# Patient Record
Sex: FEMALE | Race: WHITE | NOT HISPANIC OR LATINO | URBAN - METROPOLITAN AREA
[De-identification: names, ages, dates, MRNs, and addresses within clinical notes are randomized per-mention and may not be internally consistent; named-entity substitution may affect disease eponyms.]

---

## 2017-04-27 ENCOUNTER — ON CAMPUS - OUTPATIENT (AMBULATORY)
Dept: URBAN - METROPOLITAN AREA HOSPITAL 2 | Facility: HOSPITAL | Age: 67
End: 2017-04-27
Payer: COMMERCIAL

## 2017-04-27 ENCOUNTER — HOSPITAL ENCOUNTER (OUTPATIENT)
Dept: OTHER | Facility: HOSPITAL | Age: 67
Setting detail: SPECIMEN
Discharge: HOME | End: 2017-04-27
Attending: INTERNAL MEDICINE | Admitting: INTERNAL MEDICINE

## 2017-04-27 ENCOUNTER — OFFICE (AMBULATORY)
Dept: URBAN - METROPOLITAN AREA CLINIC 64 | Facility: CLINIC | Age: 67
End: 2017-04-27
Payer: COMMERCIAL

## 2017-04-27 VITALS
HEART RATE: 71 BPM | SYSTOLIC BLOOD PRESSURE: 95 MMHG | DIASTOLIC BLOOD PRESSURE: 48 MMHG | DIASTOLIC BLOOD PRESSURE: 72 MMHG | DIASTOLIC BLOOD PRESSURE: 58 MMHG | SYSTOLIC BLOOD PRESSURE: 102 MMHG | SYSTOLIC BLOOD PRESSURE: 122 MMHG | SYSTOLIC BLOOD PRESSURE: 135 MMHG | HEART RATE: 73 BPM | SYSTOLIC BLOOD PRESSURE: 97 MMHG | OXYGEN SATURATION: 95 % | OXYGEN SATURATION: 96 % | SYSTOLIC BLOOD PRESSURE: 153 MMHG | DIASTOLIC BLOOD PRESSURE: 79 MMHG | DIASTOLIC BLOOD PRESSURE: 97 MMHG | DIASTOLIC BLOOD PRESSURE: 64 MMHG | HEART RATE: 75 BPM | DIASTOLIC BLOOD PRESSURE: 62 MMHG | RESPIRATION RATE: 18 BRPM | SYSTOLIC BLOOD PRESSURE: 115 MMHG | OXYGEN SATURATION: 97 % | OXYGEN SATURATION: 98 % | WEIGHT: 196 LBS | HEART RATE: 78 BPM | HEART RATE: 88 BPM | HEIGHT: 64 IN | DIASTOLIC BLOOD PRESSURE: 71 MMHG | TEMPERATURE: 96.9 F | RESPIRATION RATE: 16 BRPM | SYSTOLIC BLOOD PRESSURE: 174 MMHG | OXYGEN SATURATION: 100 % | HEART RATE: 82 BPM

## 2017-04-27 DIAGNOSIS — Z12.11 ENCOUNTER FOR SCREENING FOR MALIGNANT NEOPLASM OF COLON: ICD-10-CM

## 2017-04-27 DIAGNOSIS — K57.30 DIVERTICULOSIS OF LARGE INTESTINE WITHOUT PERFORATION OR ABS: ICD-10-CM

## 2017-04-27 DIAGNOSIS — K62.1 RECTAL POLYP: ICD-10-CM

## 2017-04-27 DIAGNOSIS — K31.7 POLYP OF STOMACH AND DUODENUM: ICD-10-CM

## 2017-04-27 DIAGNOSIS — K64.1 SECOND DEGREE HEMORRHOIDS: ICD-10-CM

## 2017-04-27 DIAGNOSIS — K21.9 GASTRO-ESOPHAGEAL REFLUX DISEASE WITHOUT ESOPHAGITIS: ICD-10-CM

## 2017-04-27 LAB
GI HISTOLOGY: A. SELECT: (no result)
GI HISTOLOGY: B. UNSPECIFIED: (no result)
GI HISTOLOGY: PDF REPORT: (no result)

## 2017-04-27 PROCEDURE — 45380 COLONOSCOPY AND BIOPSY: CPT | Mod: PT | Performed by: INTERNAL MEDICINE

## 2017-04-27 PROCEDURE — 88305 TISSUE EXAM BY PATHOLOGIST: CPT | Mod: 26 | Performed by: INTERNAL MEDICINE

## 2017-04-27 PROCEDURE — 43239 EGD BIOPSY SINGLE/MULTIPLE: CPT | Performed by: INTERNAL MEDICINE

## 2017-04-27 RX ORDER — RANITIDINE 300 MG/1
300 TABLET ORAL
Qty: 30 | Refills: 11 | Status: COMPLETED
Start: 2017-04-27 | End: 2021-09-08

## 2017-04-27 RX ADMIN — PROPOFOL: 10 INJECTION, EMULSION INTRAVENOUS at 07:55

## 2017-05-03 ENCOUNTER — HOSPITAL ENCOUNTER (OUTPATIENT)
Dept: INTERVENTIONAL RADIOLOGY/VASCULAR | Facility: HOSPITAL | Age: 67
Discharge: HOME | End: 2017-05-03
Attending: PHYSICIAN ASSISTANT | Admitting: PHYSICIAN ASSISTANT

## 2021-09-08 ENCOUNTER — OFFICE (AMBULATORY)
Dept: URBAN - METROPOLITAN AREA CLINIC 64 | Facility: CLINIC | Age: 71
End: 2021-09-08
Payer: MEDICARE

## 2021-09-08 VITALS
WEIGHT: 158 LBS | HEIGHT: 64 IN | DIASTOLIC BLOOD PRESSURE: 67 MMHG | SYSTOLIC BLOOD PRESSURE: 128 MMHG | HEART RATE: 67 BPM

## 2021-09-08 DIAGNOSIS — K21.9 GASTRO-ESOPHAGEAL REFLUX DISEASE WITHOUT ESOPHAGITIS: ICD-10-CM

## 2021-09-08 DIAGNOSIS — R93.2 ABNORMAL FINDINGS ON DIAGNOSTIC IMAGING OF LIVER AND BILIARY: ICD-10-CM

## 2021-09-08 DIAGNOSIS — K83.8 OTHER SPECIFIED DISEASES OF BILIARY TRACT: ICD-10-CM

## 2021-09-08 PROCEDURE — 99203 OFFICE O/P NEW LOW 30 MIN: CPT | Performed by: NURSE PRACTITIONER

## 2021-09-08 RX ORDER — FAMOTIDINE 40 MG/1
TABLET, FILM COATED ORAL
Qty: 30 | Refills: 10 | Status: COMPLETED
Start: 2021-09-08 | End: 2022-12-29

## 2021-12-30 ENCOUNTER — OFFICE (AMBULATORY)
Dept: URBAN - METROPOLITAN AREA CLINIC 64 | Facility: CLINIC | Age: 71
End: 2021-12-30

## 2021-12-30 VITALS
SYSTOLIC BLOOD PRESSURE: 131 MMHG | HEIGHT: 64 IN | HEART RATE: 59 BPM | DIASTOLIC BLOOD PRESSURE: 73 MMHG | WEIGHT: 161 LBS

## 2021-12-30 DIAGNOSIS — K21.9 GASTRO-ESOPHAGEAL REFLUX DISEASE WITHOUT ESOPHAGITIS: ICD-10-CM

## 2021-12-30 PROCEDURE — 99214 OFFICE O/P EST MOD 30 MIN: CPT | Performed by: INTERNAL MEDICINE

## 2021-12-30 RX ORDER — PANTOPRAZOLE 40 MG/1
TABLET, DELAYED RELEASE ORAL
Qty: 180 | Refills: 1 | Status: ACTIVE

## 2022-01-19 PROBLEM — M53.3 SACROILIAC JOINT PAIN: Status: ACTIVE | Noted: 2017-04-14

## 2022-01-19 PROBLEM — M50.10 CERVICAL DISC PROLAPSE WITH RADICULOPATHY: Status: ACTIVE | Noted: 2017-03-07

## 2022-01-19 PROBLEM — Z83.3 FAMILY HISTORY OF DIABETES MELLITUS: Status: ACTIVE | Noted: 2022-01-19

## 2022-01-19 PROBLEM — M54.2 NECK PAIN: Status: ACTIVE | Noted: 2017-03-07

## 2022-01-19 PROBLEM — M50.10 CERVICAL DISC DISORDER WITH RADICULOPATHY: Status: ACTIVE | Noted: 2017-03-07

## 2022-01-19 RX ORDER — SIMVASTATIN 40 MG
TABLET ORAL
COMMUNITY
Start: 2017-03-07 | End: 2022-01-28

## 2022-01-19 RX ORDER — GABAPENTIN 100 MG/1
CAPSULE ORAL
COMMUNITY
Start: 2017-03-07 | End: 2022-05-04

## 2022-01-19 RX ORDER — MELATONIN
COMMUNITY
Start: 2017-03-07

## 2022-01-19 RX ORDER — LISINOPRIL 5 MG/1
TABLET ORAL
COMMUNITY
Start: 2017-03-07 | End: 2022-01-28

## 2022-01-19 RX ORDER — PANTOPRAZOLE SODIUM 40 MG/1
TABLET, DELAYED RELEASE ORAL 2 TIMES DAILY
COMMUNITY
Start: 2017-03-07

## 2022-01-19 RX ORDER — HYDROCHLOROTHIAZIDE 12.5 MG/1
TABLET ORAL
COMMUNITY
Start: 2017-03-07 | End: 2022-01-28

## 2022-01-19 RX ORDER — GABAPENTIN 300 MG/1
1 CAPSULE ORAL EVERY 8 HOURS
COMMUNITY
Start: 2017-10-11 | End: 2022-05-04

## 2022-01-19 RX ORDER — IBUPROFEN 200 MG
TABLET ORAL
COMMUNITY
Start: 2017-03-07 | End: 2022-01-28

## 2022-01-19 RX ORDER — SULINDAC 200 MG/1
TABLET ORAL
COMMUNITY
Start: 2017-03-07 | End: 2022-01-28

## 2022-01-19 RX ORDER — TRAMADOL HYDROCHLORIDE 50 MG/1
TABLET ORAL
COMMUNITY
Start: 2017-03-07 | End: 2022-05-04

## 2022-01-19 RX ORDER — MULTIPLE VITAMINS W/ MINERALS TAB 9MG-400MCG
TAB ORAL
COMMUNITY
Start: 2017-03-07

## 2022-01-19 RX ORDER — ASPIRIN 81 MG/1
TABLET ORAL
COMMUNITY
Start: 2017-03-07

## 2022-01-28 ENCOUNTER — CONSULT (OUTPATIENT)
Dept: CARDIOLOGY | Facility: CLINIC | Age: 72
End: 2022-01-28

## 2022-01-28 VITALS
HEIGHT: 64 IN | HEART RATE: 83 BPM | BODY MASS INDEX: 27.66 KG/M2 | SYSTOLIC BLOOD PRESSURE: 115 MMHG | DIASTOLIC BLOOD PRESSURE: 71 MMHG | WEIGHT: 162 LBS | OXYGEN SATURATION: 97 %

## 2022-01-28 DIAGNOSIS — R00.2 PALPITATIONS: ICD-10-CM

## 2022-01-28 DIAGNOSIS — I10 PRIMARY HYPERTENSION: ICD-10-CM

## 2022-01-28 DIAGNOSIS — I48.0 PAROXYSMAL ATRIAL FIBRILLATION: Primary | ICD-10-CM

## 2022-01-28 DIAGNOSIS — I25.10 CORONARY ARTERY DISEASE INVOLVING NATIVE CORONARY ARTERY OF NATIVE HEART WITHOUT ANGINA PECTORIS: ICD-10-CM

## 2022-01-28 DIAGNOSIS — E78.2 MIXED HYPERLIPIDEMIA: ICD-10-CM

## 2022-01-28 PROCEDURE — 99203 OFFICE O/P NEW LOW 30 MIN: CPT | Performed by: INTERNAL MEDICINE

## 2022-01-28 RX ORDER — SODIUM PHOSPHATE,MONO-DIBASIC 19G-7G/118
ENEMA (ML) RECTAL
COMMUNITY

## 2022-01-28 RX ORDER — GLUCOSAMINE/D3/BOSWELLIA SERRA 1500MG-400
TABLET ORAL
COMMUNITY

## 2022-01-28 RX ORDER — OXYBUTYNIN CHLORIDE 10 MG/1
10 TABLET, EXTENDED RELEASE ORAL DAILY
COMMUNITY
End: 2022-11-03 | Stop reason: ALTCHOICE

## 2022-01-28 RX ORDER — ATORVASTATIN CALCIUM 80 MG/1
80 TABLET, FILM COATED ORAL DAILY
COMMUNITY

## 2022-01-28 RX ORDER — CETIRIZINE HYDROCHLORIDE 10 MG/1
CAPSULE, LIQUID FILLED ORAL
COMMUNITY

## 2022-01-28 NOTE — PROGRESS NOTES
Cardiology Office Visit      Encounter Date:  01/28/2022    Patient ID:   Nesha Roman is a 71 y.o. female.    Reason For Followup:  Paroxysmal atrial fibrillation  Hypertension  Hyperlipidemia  Coronary artery disease    Brief Clinical History:  Dear Shanell Mckeon APRN    I had the pleasure of seeing Nesha oRman today. As you are well aware, this is a 71 y.o. female past medical history that is significant for history of atrial fibrillation hypertension hyperlipidemia none coronary artery disease here to establish care    Interval History:  She was hospitalized last year and was diagnosed with atrial fibrillation with rapid atrial response during the cardiac evaluation patient had an echocardiogram cardiac catheterization here for follow-up  Denies any new cardiac symptoms  Intermittent palpitations secondary to proximal atrial fibrillation otherwise no symptoms  Atrial fibrillation is well controlled  Denies any symptoms of chest pain shortness of breath dizziness or syncope    Assessment & Plan    Impressions:  Paroxysmal atrial fibrillation/maintaining sinus rhythm  Hypertension/pressures controlled  Hyperlipidemia/controlled on high-dose statins will check lipids  Coronary artery disease/catheterization last year with a moderate obstructive disease involving the LAD with a negative FFR  Normal LV systolic function  Moderate obstructive disease involving LAD    Recommendations:  Continue current medical therapy with aspirin and anticoagulation therapy with Xarelto  Risk benefits and alternatives for long-term oral anticoagulation reviewed and discussed with the patient  Continue current therapy with high-dose statin  Continue current dose of beta-blockers  Check labs including lipid profile  Prior cardiac findings including echocardiogram Findings and labs from before during last hospitalization including CT abdomen chest reviewed and discussed with the patient  Follow-up in office in 6  "months        Objective:    Vitals:  Vitals:    01/28/22 1022   BP: 115/71   Pulse: 83   SpO2: 97%   Weight: 73.5 kg (162 lb)   Height: 162.6 cm (64\")     Body mass index is 27.81 kg/m².      Physical Exam:    General: Alert, cooperative, no distress, appears stated age  Head:  Normocephalic, atraumatic, mucous membranes moist  Eyes:  Conjunctiva/corneas clear, EOM's intact     Neck:  Supple,  no bruit    Lungs: Clear to auscultation bilaterally, no wheezes rhonchi rales are noted  Chest wall: No tenderness  Heart::  Regular rate and rhythm, S1 and S2 normal, no murmur, rub or gallop  Abdomen: Soft, non-tender, nondistended bowel sounds active  Extremities: No cyanosis, clubbing, or edema  Pulses: 2+ and symmetric all extremities  Skin:  No rashes or lesions  Neuro/psych: A&O x3. CN II through XII are grossly intact with appropriate affect      Allergies:  No Known Allergies    Medication Review:     Current Outpatient Medications:   •  aspirin (Adult Aspirin EC Low Strength) 81 MG EC tablet, ADULT ASPIRIN EC LOW STRENGTH 81 MG ORAL TABLET DELAYED RELEASE, Disp: , Rfl:   •  atorvastatin (LIPITOR) 80 MG tablet, Take 80 mg by mouth Daily., Disp: , Rfl:   •  Biotin 29532 MCG tablet, Take  by mouth., Disp: , Rfl:   •  Cetirizine HCl (ZyrTEC Allergy) 10 MG capsule, Take  by mouth., Disp: , Rfl:   •  cholecalciferol (D3-1000) 25 MCG (1000 UT) tablet, D3-1000 1000 UNIT TABS, Disp: , Rfl:   •  docusate sodium (COLACE) 50 MG capsule, Take  by mouth 2 (Two) Times a Day., Disp: , Rfl:   •  gabapentin (NEURONTIN) 100 MG capsule, GABAPENTIN 100 MG CAPS, Disp: , Rfl:   •  gabapentin (NEURONTIN) 300 MG capsule, 1 capsule Every 8 (Eight) Hours., Disp: , Rfl:   •  glucosamine sulfate 500 MG capsule capsule, Take  by mouth 3 (Three) Times a Day With Meals., Disp: , Rfl:   •  hydroCHLOROthiazide (HYDRODIURIL) 12.5 MG tablet, HYDROCHLOROTHIAZIDE 12.5 MG TABS, Disp: , Rfl:   •  ibuprofen (ADVIL,MOTRIN) 200 MG tablet, IBUPROFEN 200 MG " TABS, Disp: , Rfl:   •  lisinopril (PRINIVIL,ZESTRIL) 5 MG tablet, LISINOPRIL 5 MG TABS, Disp: , Rfl:   •  metoprolol tartrate (LOPRESSOR) 25 MG tablet, Take 25 mg by mouth 2 (Two) Times a Day., Disp: , Rfl:   •  multivitamin with minerals (Hair Skin and Nails Formula) tablet tablet, HAIR SKIN AND NAILS FORMULA TABS, Disp: , Rfl:   •  oxybutynin XL (DITROPAN-XL) 10 MG 24 hr tablet, Take 10 mg by mouth Daily., Disp: , Rfl:   •  pantoprazole (PROTONIX) 40 MG EC tablet, 2 (Two) Times a Day., Disp: , Rfl:   •  Polyethylene Glycol 3350 (MIRALAX PO), Take  by mouth., Disp: , Rfl:   •  rivaroxaban (XARELTO) 20 MG tablet, Take 20 mg by mouth Daily., Disp: , Rfl:   •  simvastatin (ZOCOR) 40 MG tablet, SIMVASTATIN 40 MG TABS, Disp: , Rfl:   •  sulindac (CLINORIL) 200 MG tablet, SULINDAC 200 MG TABS, Disp: , Rfl:   •  traMADol (ULTRAM) 50 MG tablet, TRAMADOL HCL 50 MG TABS, Disp: , Rfl:     Family History:  Family History   Problem Relation Age of Onset   • Heart disease Father        Past Medical History:  Past Medical History:   Diagnosis Date   • Hyperlipidemia    • Hypertension        Past Surgical History:  Past Surgical History:   Procedure Laterality Date   • GALLBLADDER SURGERY     • HYSTERECTOMY     • KNEE ARTHROPLASTY, PARTIAL REPLACEMENT         Social History:  Social History     Socioeconomic History   • Marital status:    Tobacco Use   • Smoking status: Former Smoker   • Smokeless tobacco: Never Used   Substance and Sexual Activity   • Alcohol use: Yes   • Drug use: Never       Review of Systems:  The following systems were reviewed as they relate to the cardiovascular system: Constitutional, Eyes, ENT, Cardiovascular, Respiratory, Gastrointestinal, Integumentary, Neurological, Psychiatric, Hematologic, Endocrine, Musculoskeletal, and Genitourinary. The pertinent cardiovascular findings are reported above with all other cardiovascular points within those systems being negative.    Diagnostic Study Review:      Current Electrocardiogram:  Procedures    Laboratory Data:  Lab Results   Component Value Date    BUN 24 (H) 03/19/2021    CREATININE 0.8 03/19/2021    BCR 30.0 (H) 03/19/2021    K 5.0 03/24/2021    CO2 25 03/19/2021    CALCIUM 8.9 03/19/2021    ALBUMIN 4.0 03/19/2021    LABIL2 1.5 03/19/2021    AST 25 03/19/2021    ALT 13 03/19/2021     Lab Results   Component Value Date    CALCIUM 8.9 03/19/2021     03/19/2021    K 5.0 03/24/2021    CO2 25 03/19/2021     03/19/2021    BUN 24 (H) 03/19/2021    CREATININE 0.8 03/19/2021    BCR 30.0 (H) 03/19/2021     Lab Results   Component Value Date    WBC 5.58 08/31/2021    HGB 11.8 (L) 08/31/2021    HCT 37.2 08/31/2021    MCV 98.2 08/31/2021     08/31/2021     Lab Results   Component Value Date    CHLPL 134 06/18/2020    TRIG 38 06/18/2020    HDL 68 06/18/2020    LDL 58 06/18/2020     No results found for: HGBA1C  Lab Results   Component Value Date    INR 1.2 07/08/2021    PROTIME 13.4 (H) 07/08/2021       Most Recent Echo:       Most Recent Stress Test:       Most Recent Cardiac Catheterization:   No results found for this or any previous visit.       NOTE: The following portions of the patient's note were reviewed, confirmed and/or updated this visit as appropriate: History of present illness/Interval history, physical examination, assessment & plan, allergies, current medications, past family history, past medical history, past social history, past surgical history and problem list.

## 2022-02-28 ENCOUNTER — TELEPHONE (OUTPATIENT)
Dept: CARDIOLOGY | Facility: CLINIC | Age: 72
End: 2022-02-28

## 2022-02-28 NOTE — TELEPHONE ENCOUNTER
Called and notified per Dr Torres, patient verbalized understanding.    ------------------------    Myra Torres MD Trent, Melissa, MA    Labs look normal

## 2022-04-07 RX ORDER — GABAPENTIN 400 MG/1
400 CAPSULE ORAL 3 TIMES DAILY
COMMUNITY
Start: 2022-03-17 | End: 2022-11-03

## 2022-04-07 RX ORDER — TRAMADOL HYDROCHLORIDE 50 MG/1
50 TABLET ORAL
COMMUNITY
Start: 2021-12-21

## 2022-05-04 ENCOUNTER — OFFICE VISIT (OUTPATIENT)
Dept: CARDIOLOGY | Facility: CLINIC | Age: 72
End: 2022-05-04

## 2022-05-04 VITALS
OXYGEN SATURATION: 96 % | HEIGHT: 64 IN | WEIGHT: 163 LBS | DIASTOLIC BLOOD PRESSURE: 74 MMHG | SYSTOLIC BLOOD PRESSURE: 123 MMHG | HEART RATE: 55 BPM | BODY MASS INDEX: 27.83 KG/M2

## 2022-05-04 DIAGNOSIS — I48.0 PAROXYSMAL ATRIAL FIBRILLATION: Primary | ICD-10-CM

## 2022-05-04 DIAGNOSIS — I10 PRIMARY HYPERTENSION: ICD-10-CM

## 2022-05-04 DIAGNOSIS — G47.33 OSA (OBSTRUCTIVE SLEEP APNEA): ICD-10-CM

## 2022-05-04 DIAGNOSIS — E78.2 MIXED HYPERLIPIDEMIA: ICD-10-CM

## 2022-05-04 PROCEDURE — 99214 OFFICE O/P EST MOD 30 MIN: CPT | Performed by: INTERNAL MEDICINE

## 2022-05-04 PROCEDURE — 93000 ELECTROCARDIOGRAM COMPLETE: CPT | Performed by: INTERNAL MEDICINE

## 2022-05-04 NOTE — PROGRESS NOTES
Cardiology Office Visit      Encounter Date:  05/04/2022    Patient ID:   Nesha Roman is a 71 y.o. female.      Reason For Followup:  Paroxysmal atrial fibrillation  Hypertension  Hyperlipidemia  Coronary artery disease    Brief Clinical History:  Dear Shanell Mckeon APRN    I had the pleasure of seeing Nesha Roman today. As you are well aware, this is a 71 y.o. female past medical history that is significant for history of atrial fibrillation hypertension hyperlipidemia none coronary artery disease here to establish care    Interval History:  Denies any new cardiac symptoms  Denies any new cardiac symptoms  Intermittent palpitations secondary to proximal atrial fibrillation otherwise no symptoms  Atrial fibrillation is well controlled  Denies any symptoms of chest pain shortness of breath dizziness or syncope    Assessment & Plan    Impressions:  Paroxysmal atrial fibrillation/maintaining sinus rhythm  Hypertension/pressures controlled  Hyperlipidemia/controlled on high-dose statins will check lipids  Coronary artery disease/catheterization last year with a moderate obstructive disease involving the LAD with a negative FFR  Normal LV systolic function  Moderate obstructive disease involving LAD    Recommendations:  Continue current medical therapy with aspirin and anticoagulation therapy with Xarelto  Risk benefits and alternatives for long-term oral anticoagulation reviewed and discussed with the patient  Continue current therapy with high-dose statin  Continue current dose of beta-blockers  Check labs including lipid profile  Prior cardiac findings including echocardiogram Findings and labs from before during last hospitalization including CT abdomen chest reviewed and discussed with the patient  Continue current medical therapy with Xarelto 20 mg p.o. once a day metoprolol 25 mg p.o. twice daily Lipitor 80 mg p.o. once a day aspirin 81 mg p.o. once a day  Recent labs and work-up reviewed and  "discussed with the patient  Follow-up in office in 6 months    Objective:    Vitals:  Vitals:    05/04/22 1359   BP: 123/74   Pulse: 55   SpO2: 96%   Weight: 73.9 kg (163 lb)   Height: 162.6 cm (64\")     Body mass index is 27.98 kg/m².      Physical Exam:    General: Alert, cooperative, no distress, appears stated age  Head:  Normocephalic, atraumatic, mucous membranes moist  Eyes:  Conjunctiva/corneas clear, EOM's intact     Neck:  Supple,  no bruit    Lungs: Clear to auscultation bilaterally, no wheezes rhonchi rales are noted  Chest wall: No tenderness  Heart::  Regular rate and rhythm, S1 and S2 normal, no murmur, rub or gallop  Abdomen: Soft, non-tender, nondistended bowel sounds active  Extremities: No cyanosis, clubbing, or edema  Pulses: 2+ and symmetric all extremities  Skin:  No rashes or lesions  Neuro/psych: A&O x3. CN II through XII are grossly intact with appropriate affect      Allergies:  No Known Allergies    Medication Review:     Current Outpatient Medications:   •  aspirin 81 MG EC tablet, ADULT ASPIRIN EC LOW STRENGTH 81 MG ORAL TABLET DELAYED RELEASE, Disp: , Rfl:   •  atorvastatin (LIPITOR) 80 MG tablet, Take 80 mg by mouth Daily., Disp: , Rfl:   •  Biotin 37666 MCG tablet, Take  by mouth., Disp: , Rfl:   •  Cetirizine HCl (ZyrTEC Allergy) 10 MG capsule, Take  by mouth., Disp: , Rfl:   •  cholecalciferol (VITAMIN D3) 25 MCG (1000 UT) tablet, D3-1000 1000 UNIT TABS, Disp: , Rfl:   •  docusate sodium (COLACE) 50 MG capsule, Take  by mouth 2 (Two) Times a Day., Disp: , Rfl:   •  gabapentin (NEURONTIN) 100 MG capsule, GABAPENTIN 100 MG CAPS, Disp: , Rfl:   •  gabapentin (NEURONTIN) 300 MG capsule, 1 capsule Every 8 (Eight) Hours., Disp: , Rfl:   •  gabapentin (NEURONTIN) 400 MG capsule, Take 400 mg by mouth 3 (Three) Times a Day., Disp: , Rfl:   •  glucosamine sulfate 500 MG capsule capsule, Take  by mouth 3 (Three) Times a Day With Meals., Disp: , Rfl:   •  metoprolol tartrate (LOPRESSOR) 25 MG " tablet, Take 1 tablet by mouth 2 (Two) Times a Day., Disp: 60 tablet, Rfl: 3  •  multivitamin with minerals tablet tablet, HAIR SKIN AND NAILS FORMULA TABS, Disp: , Rfl:   •  oxybutynin XL (DITROPAN-XL) 10 MG 24 hr tablet, Take 10 mg by mouth Daily., Disp: , Rfl:   •  pantoprazole (PROTONIX) 40 MG EC tablet, 2 (Two) Times a Day., Disp: , Rfl:   •  Polyethylene Glycol 3350 (MIRALAX PO), Take  by mouth., Disp: , Rfl:   •  rivaroxaban (XARELTO) 20 MG tablet, Take 1 tablet by mouth Daily., Disp: 90 tablet, Rfl: 3  •  traMADol (ULTRAM) 50 MG tablet, Take 50 mg by mouth., Disp: , Rfl:   •  traMADol (ULTRAM) 50 MG tablet, TRAMADOL HCL 50 MG TABS, Disp: , Rfl:     Family History:  Family History   Problem Relation Age of Onset   • Heart disease Father        Past Medical History:  Past Medical History:   Diagnosis Date   • Hyperlipidemia    • Hypertension        Past Surgical History:  Past Surgical History:   Procedure Laterality Date   • GALLBLADDER SURGERY     • HYSTERECTOMY     • KNEE ARTHROPLASTY, PARTIAL REPLACEMENT         Social History:  Social History     Socioeconomic History   • Marital status:    Tobacco Use   • Smoking status: Former Smoker   • Smokeless tobacco: Never Used   Substance and Sexual Activity   • Alcohol use: Yes   • Drug use: Never       Review of Systems:  The following systems were reviewed as they relate to the cardiovascular system: Constitutional, Eyes, ENT, Cardiovascular, Respiratory, Gastrointestinal, Integumentary, Neurological, Psychiatric, Hematologic, Endocrine, Musculoskeletal, and Genitourinary. The pertinent cardiovascular findings are reported above with all other cardiovascular points within those systems being negative.    Diagnostic Study Review:     Current Electrocardiogram:    ECG 12 Lead    Date/Time: 5/4/2022 4:16 PM  Performed by: Myra Torres MD  Authorized by: Myar Torres MD   Comparison: compared with previous ECG   Similar to previous  ECG  Rhythm: sinus rhythm and sinus bradycardia  Rate: normal  BPM: 55  Conduction: conduction normal  QRS axis: normal  Other findings: non-specific ST-T wave changes    Clinical impression: abnormal EKG            Laboratory Data:  Lab Results   Component Value Date    BUN 24 (H) 03/19/2021    CREATININE 0.8 03/19/2021    BCR 30.0 (H) 03/19/2021    K 5.0 03/24/2021    CO2 25 03/19/2021    CALCIUM 8.9 03/19/2021    ALBUMIN 4.0 03/19/2021    LABIL2 1.5 03/19/2021    AST 25 03/19/2021    ALT 13 03/19/2021     Lab Results   Component Value Date    CALCIUM 8.9 03/19/2021     03/19/2021    K 5.0 03/24/2021    CO2 25 03/19/2021     03/19/2021    BUN 24 (H) 03/19/2021    CREATININE 0.8 03/19/2021    BCR 30.0 (H) 03/19/2021     Lab Results   Component Value Date    WBC 5.58 08/31/2021    HGB 11.8 (L) 08/31/2021    HCT 37.2 08/31/2021    MCV 98.2 08/31/2021     08/31/2021     Lab Results   Component Value Date    CHLPL 134 06/18/2020    TRIG 38 06/18/2020    HDL 68 06/18/2020    LDL 58 06/18/2020     No results found for: HGBA1C  Lab Results   Component Value Date    INR 1.2 07/08/2021    PROTIME 13.4 (H) 07/08/2021       Most Recent Echo:       Most Recent Stress Test:       Most Recent Cardiac Catheterization:   No results found for this or any previous visit.       NOTE: The following portions of the patient's note were reviewed, confirmed and/or updated this visit as appropriate: History of present illness/Interval history, physical examination, assessment & plan, allergies, current medications, past family history, past medical history, past social history, past surgical history and problem list.

## 2022-06-20 ENCOUNTER — OFFICE VISIT (OUTPATIENT)
Dept: SLEEP MEDICINE | Facility: CLINIC | Age: 72
End: 2022-06-20

## 2022-06-20 VITALS
DIASTOLIC BLOOD PRESSURE: 56 MMHG | OXYGEN SATURATION: 99 % | HEIGHT: 64 IN | SYSTOLIC BLOOD PRESSURE: 122 MMHG | WEIGHT: 163 LBS | BODY MASS INDEX: 27.83 KG/M2 | HEART RATE: 57 BPM

## 2022-06-20 DIAGNOSIS — G47.8 NON-RESTORATIVE SLEEP: ICD-10-CM

## 2022-06-20 DIAGNOSIS — E66.3 OVERWEIGHT WITH BODY MASS INDEX (BMI) 25.0-29.9: ICD-10-CM

## 2022-06-20 DIAGNOSIS — G47.10 HYPERSOMNIA: ICD-10-CM

## 2022-06-20 DIAGNOSIS — G47.30 SLEEP APNEA, UNSPECIFIED TYPE: Primary | ICD-10-CM

## 2022-06-20 DIAGNOSIS — I48.91 ATRIAL FIBRILLATION, UNSPECIFIED TYPE: ICD-10-CM

## 2022-06-20 PROCEDURE — 99204 OFFICE O/P NEW MOD 45 MIN: CPT | Performed by: FAMILY MEDICINE

## 2022-06-20 PROCEDURE — G0463 HOSPITAL OUTPT CLINIC VISIT: HCPCS

## 2022-06-20 RX ORDER — ZOLPIDEM TARTRATE 5 MG/1
TABLET ORAL
Qty: 2 TABLET | Refills: 0 | Status: SHIPPED | OUTPATIENT
Start: 2022-06-20 | End: 2022-11-03

## 2022-06-20 NOTE — PROGRESS NOTES
[facility] New Patient[facility]Consultation       Reason for Consultation: Paroxysmal atrial fibrillation      Patient Care Team:  Shanell Garland APRN as PCP - General  Kiara Torres MD as Consulting Physician (Sleep Medicine)      History of present illness:  Thank you for asking me to see your patient.  The patient is a 71 y.o. female With atrial fibrillation hypertension hyperlipidemia CAD presents today with concern for sleep disorder.  Referred by cardiologist.  No history of prior sleep study.  Tonsillectomy around 1955.  Patient reports hypersomnia nonrestorative sleep witnessed apneas waking with dry mouth pain disrupting sleep waking at night with GERD symptoms muscle weakness with extreme emotion nocturia to 3 times a night generally restless sleep.  No family history of sleep apnea she is aware.  Overweight BMI 28.    Bedtime 11 PM sleep latency 30 minutes wake time 7:30 AM to 8 AM sleeps 8 to 9 hours 0 naps no rotating shifts.      Social History: No tobacco use no drug use no caffeine use 1-2 beers a week    Allergies:  Patient has no known allergies.    Family History: SARIKA no       Current Outpatient Medications:   •  aspirin 81 MG EC tablet, ADULT ASPIRIN EC LOW STRENGTH 81 MG ORAL TABLET DELAYED RELEASE, Disp: , Rfl:   •  atorvastatin (LIPITOR) 80 MG tablet, Take 80 mg by mouth Daily., Disp: , Rfl:   •  Biotin 05827 MCG tablet, Take  by mouth., Disp: , Rfl:   •  Cetirizine HCl (ZyrTEC Allergy) 10 MG capsule, Take  by mouth., Disp: , Rfl:   •  cholecalciferol (VITAMIN D3) 25 MCG (1000 UT) tablet, D3-1000 1000 UNIT TABS, Disp: , Rfl:   •  docusate sodium (COLACE) 50 MG capsule, Take  by mouth 2 (Two) Times a Day., Disp: , Rfl:   •  gabapentin (NEURONTIN) 400 MG capsule, Take 400 mg by mouth 3 (Three) Times a Day., Disp: , Rfl:   •  glucosamine sulfate 500 MG capsule capsule, Take  by mouth 3 (Three) Times a Day With Meals., Disp: , Rfl:   •  metoprolol tartrate (LOPRESSOR) 25 MG tablet, Take 1  "tablet by mouth 2 (Two) Times a Day., Disp: 60 tablet, Rfl: 3  •  multivitamin with minerals tablet tablet, HAIR SKIN AND NAILS FORMULA TABS, Disp: , Rfl:   •  oxybutynin XL (DITROPAN-XL) 10 MG 24 hr tablet, Take 10 mg by mouth Daily., Disp: , Rfl:   •  pantoprazole (PROTONIX) 40 MG EC tablet, 2 (Two) Times a Day., Disp: , Rfl:   •  Polyethylene Glycol 3350 (MIRALAX PO), Take  by mouth., Disp: , Rfl:   •  rivaroxaban (XARELTO) 20 MG tablet, Take 1 tablet by mouth Daily., Disp: 90 tablet, Rfl: 3  •  traMADol (ULTRAM) 50 MG tablet, Take 50 mg by mouth., Disp: , Rfl:   •  zolpidem (Ambien) 5 MG tablet, Take one tab as needed for sleep at night of sleep study only. Do not use at home., Disp: 2 tablet, Rfl: 0    Vital Signs:    Vitals:    06/20/22 1450   BP: 122/56   BP Location: Left arm   Patient Position: Sitting   Cuff Size: Adult   Pulse: 57   SpO2: 99%   Weight: 73.9 kg (163 lb)   Height: 162.6 cm (64\")      Body mass index is 27.98 kg/m².         REVIEW OF SYSTEMS.  Full review of systems available on the intake form which is scanned in the media tab.  The relevant positive are noted below  1. Daytime excessive sleepiness with Odonnell Sleepiness Scale :    2. Irregular heartbeat frequent urination frequent heartburn      Physical exam:  Vitals:    06/20/22 1450   BP: 122/56   BP Location: Left arm   Patient Position: Sitting   Cuff Size: Adult   Pulse: 57   SpO2: 99%   Weight: 73.9 kg (163 lb)   Height: 162.6 cm (64\")    Body mass index is 27.98 kg/m².    HEENT: Head is atraumatic, normocephalic  Eyes: pupils are round equal and reacting to light and accommodation, conjunctiva normal  RESPIRATORY SYSTEM: Regular respirations  CARDIOVASULAR SYSTEM: Regular rate  EXTREMITES: No cyanosis, clubbing  NEUROLOGICAL SYSTEM: Oriented x 3, no gross motor defects, ambulates with cane      Impression:  1. Sleep apnea, unspecified type    2. Hypersomnia    3. Non-restorative sleep    4. Overweight with body mass index (BMI) " 25.0-29.9    5. Atrial fibrillation, unspecified type (HCC)        Plan:    Good sleep hygiene measures should be maintained.  Weight loss would be beneficial in this patient who is overweight BMI 28.    I discussed the pathophysiology of obstructive sleep apnea with the patient.  We discussed the adverse outcomes associated with untreated sleep-disordered breathing.  We discussed treatment modalities of obstructive sleep apnea including CPAP device as well as oral mandibular advancement device. Sleep study will be scheduled to establish definitive diagnosis of sleep disorder breathing.  Weight loss will be strongly beneficial in order to reduce the severity of sleep-disordered breathing.  Patient has narrow oropharyngeal structure.  Caution during activities that require prolonged concentration is strongly advised.  Patient will be notified of sleep study results after sleep study is completed.  If sleep apnea is only mild,  oral mandibular advancement device may be one of the treatment options.  However if sleep apnea is moderately severe, CPAP treatment will be strongly encouraged.  The patient is not opposed to treatment with CPAP device if we confirm significant obstructive sleep apnea on polysomnography.     Prescription for Ambien was provided to bring to the Sleep lab to improve sleep efficiency.  Patient was asked to not take the Ambien at home. Do not take Tramadol or Gabapentin in the evening of study.     Thank you for allowing me to participate in your patient's care.    Kiara Torres MD  Sleep Medicine  06/20/22  15:10 EDT

## 2022-06-25 ENCOUNTER — LAB (OUTPATIENT)
Dept: LAB | Facility: HOSPITAL | Age: 72
End: 2022-06-25

## 2022-06-25 DIAGNOSIS — G47.30 SLEEP APNEA, UNSPECIFIED TYPE: ICD-10-CM

## 2022-06-25 DIAGNOSIS — E66.3 OVERWEIGHT WITH BODY MASS INDEX (BMI) 25.0-29.9: ICD-10-CM

## 2022-06-25 DIAGNOSIS — G47.8 NON-RESTORATIVE SLEEP: ICD-10-CM

## 2022-06-25 DIAGNOSIS — I48.91 ATRIAL FIBRILLATION, UNSPECIFIED TYPE: ICD-10-CM

## 2022-06-25 DIAGNOSIS — G47.10 HYPERSOMNIA: ICD-10-CM

## 2022-06-25 LAB — SARS-COV-2 ORF1AB RESP QL NAA+PROBE: DETECTED

## 2022-06-25 PROCEDURE — U0005 INFEC AGEN DETEC AMPLI PROBE: HCPCS

## 2022-06-25 PROCEDURE — U0004 COV-19 TEST NON-CDC HGH THRU: HCPCS

## 2022-06-25 PROCEDURE — C9803 HOPD COVID-19 SPEC COLLECT: HCPCS

## 2022-06-28 ENCOUNTER — APPOINTMENT (OUTPATIENT)
Dept: SLEEP MEDICINE | Facility: HOSPITAL | Age: 72
End: 2022-06-28

## 2022-07-27 ENCOUNTER — HOSPITAL ENCOUNTER (OUTPATIENT)
Dept: SLEEP MEDICINE | Facility: HOSPITAL | Age: 72
Discharge: HOME | End: 2022-07-27
Admitting: FAMILY MEDICINE

## 2022-07-27 DIAGNOSIS — G47.10 HYPERSOMNIA: ICD-10-CM

## 2022-07-27 DIAGNOSIS — E66.3 OVERWEIGHT WITH BODY MASS INDEX (BMI) 25.0-29.9: ICD-10-CM

## 2022-07-27 DIAGNOSIS — G47.8 NON-RESTORATIVE SLEEP: ICD-10-CM

## 2022-07-27 DIAGNOSIS — I48.91 ATRIAL FIBRILLATION, UNSPECIFIED TYPE: ICD-10-CM

## 2022-07-27 DIAGNOSIS — G47.30 SLEEP APNEA, UNSPECIFIED TYPE: ICD-10-CM

## 2022-07-27 PROCEDURE — 95811 POLYSOM 6/>YRS CPAP 4/> PARM: CPT

## 2022-07-27 PROCEDURE — 95811 POLYSOM 6/>YRS CPAP 4/> PARM: CPT | Performed by: FAMILY MEDICINE

## 2022-07-28 VITALS — HEIGHT: 64 IN | WEIGHT: 162.92 LBS | BODY MASS INDEX: 27.81 KG/M2

## 2022-08-14 DIAGNOSIS — E66.3 OVERWEIGHT WITH BODY MASS INDEX (BMI) 25.0-29.9: ICD-10-CM

## 2022-08-14 DIAGNOSIS — G47.33 OBSTRUCTIVE SLEEP APNEA: Primary | ICD-10-CM

## 2022-08-14 DIAGNOSIS — I48.91 ATRIAL FIBRILLATION, UNSPECIFIED TYPE: ICD-10-CM

## 2022-08-14 DIAGNOSIS — G47.8 NON-RESTORATIVE SLEEP: ICD-10-CM

## 2022-08-14 DIAGNOSIS — G47.10 HYPERSOMNIA: ICD-10-CM

## 2022-09-22 ENCOUNTER — OFFICE (AMBULATORY)
Dept: URBAN - METROPOLITAN AREA CLINIC 64 | Facility: CLINIC | Age: 72
End: 2022-09-22

## 2022-09-22 VITALS
DIASTOLIC BLOOD PRESSURE: 79 MMHG | SYSTOLIC BLOOD PRESSURE: 140 MMHG | WEIGHT: 158 LBS | HEIGHT: 64 IN | HEART RATE: 58 BPM

## 2022-09-22 DIAGNOSIS — K21.9 GASTRO-ESOPHAGEAL REFLUX DISEASE WITHOUT ESOPHAGITIS: ICD-10-CM

## 2022-09-22 PROCEDURE — 99213 OFFICE O/P EST LOW 20 MIN: CPT | Performed by: INTERNAL MEDICINE

## 2022-09-22 RX ORDER — FAMOTIDINE 40 MG/1
40 TABLET, FILM COATED ORAL
Qty: 30 | Refills: 11 | Status: ACTIVE
Start: 2022-09-22

## 2022-10-24 RX ORDER — ALBUTEROL SULFATE 90 UG/1
2 AEROSOL, METERED RESPIRATORY (INHALATION)
COMMUNITY
Start: 2022-02-18

## 2022-10-24 RX ORDER — OXYBUTYNIN CHLORIDE 10 MG/1
1 TABLET, EXTENDED RELEASE ORAL DAILY
COMMUNITY
Start: 2022-05-03 | End: 2022-11-03 | Stop reason: ALTCHOICE

## 2022-10-24 RX ORDER — TRAMADOL HYDROCHLORIDE 50 MG/1
50 TABLET ORAL
COMMUNITY
Start: 2022-03-17 | End: 2022-11-03

## 2022-10-24 RX ORDER — GABAPENTIN 400 MG/1
1 CAPSULE ORAL 3 TIMES DAILY
COMMUNITY
Start: 2022-06-22

## 2022-11-03 ENCOUNTER — OFFICE VISIT (OUTPATIENT)
Dept: CARDIOLOGY | Facility: CLINIC | Age: 72
End: 2022-11-03

## 2022-11-03 VITALS
SYSTOLIC BLOOD PRESSURE: 132 MMHG | HEART RATE: 62 BPM | DIASTOLIC BLOOD PRESSURE: 68 MMHG | WEIGHT: 162 LBS | BODY MASS INDEX: 27.66 KG/M2 | OXYGEN SATURATION: 98 % | HEIGHT: 64 IN

## 2022-11-03 DIAGNOSIS — I25.10 CORONARY ARTERY DISEASE INVOLVING NATIVE CORONARY ARTERY OF NATIVE HEART WITHOUT ANGINA PECTORIS: ICD-10-CM

## 2022-11-03 DIAGNOSIS — I48.0 PAROXYSMAL ATRIAL FIBRILLATION: Primary | ICD-10-CM

## 2022-11-03 DIAGNOSIS — I10 PRIMARY HYPERTENSION: ICD-10-CM

## 2022-11-03 DIAGNOSIS — E78.2 MIXED HYPERLIPIDEMIA: ICD-10-CM

## 2022-11-03 PROCEDURE — 93000 ELECTROCARDIOGRAM COMPLETE: CPT | Performed by: INTERNAL MEDICINE

## 2022-11-03 PROCEDURE — 99214 OFFICE O/P EST MOD 30 MIN: CPT | Performed by: INTERNAL MEDICINE

## 2022-11-03 RX ORDER — FAMOTIDINE 40 MG/1
40 TABLET, FILM COATED ORAL
COMMUNITY
Start: 2022-10-19

## 2022-11-03 NOTE — PROGRESS NOTES
Cardiology Office Visit      Encounter Date:  11/03/2022    Patient ID:   Nesha Roman is a 72 y.o. female.      Reason For Followup:  Paroxysmal atrial fibrillation  Hypertension  Hyperlipidemia  Coronary artery disease    Brief Clinical History:  Dear Shanell Mckeon APRN    I had the pleasure of seeing Nesha Roman today. As you are well aware, this is a 72 y.o. female past medical history that is significant for history of atrial fibrillation hypertension hyperlipidemia none coronary artery disease here to establish care    Interval History:  Denies any new cardiac symptoms  Denies any new cardiac symptoms  Intermittent palpitations secondary to proximal atrial fibrillation otherwise no symptoms  Atrial fibrillation is well controlled  Denies any symptoms of chest pain shortness of breath dizziness or syncope    Assessment & Plan    Impressions:  Paroxysmal atrial fibrillation/maintaining sinus rhythm  Hypertension/pressures controlled  Hyperlipidemia/controlled on high-dose statins will check lipids  Coronary artery disease/catheterization last year with a moderate obstructive disease involving the LAD with a negative FFR  Normal LV systolic function  Moderate obstructive disease involving LAD    Recommendations:  Continue current medical therapy with aspirin and anticoagulation therapy with Xarelto  Risk benefits and alternatives for long-term oral anticoagulation reviewed and discussed with the patient  Continue current therapy with high-dose statin  Continue current dose of beta-blockers  Check labs including lipid profile  Prior cardiac findings including echocardiogram Findings and labs from before during last hospitalization including CT abdomen chest reviewed and discussed with the patient  Continue current medical therapy with Xarelto 20 mg p.o. once a day metoprolol 25 mg p.o. twice daily Lipitor 80 mg p.o. once a day aspirin 81 mg p.o. once a day  Recent labs and work-up reviewed and  "discussed with the patient  Follow-up in office in 6 months      Objective:    Vitals:  Vitals:    11/03/22 1403   BP: 132/68   Pulse: 62   SpO2: 98%   Weight: 73.5 kg (162 lb)   Height: 162.6 cm (64\")       Physical Exam:    General: Alert, cooperative, no distress, appears stated age  Head:  Normocephalic, atraumatic, mucous membranes moist  Eyes:  Conjunctiva/corneas clear, EOM's intact     Neck:  Supple,  no adenopathy;      Lungs: Clear to auscultation bilaterally, no wheezes rhonchi rales are noted  Chest wall: No tenderness  Heart::  Regular rate and rhythm, S1 and S2 normal, no murmur, rub or gallop  Abdomen: Soft, non-tender, nondistended bowel sounds active  Extremities: No cyanosis, clubbing, or edema  Pulses: 2+ and symmetric all extremities  Skin:  No rashes or lesions  Neuro/psych: A&O x3. CN II through XII are grossly intact with appropriate affect      Allergies:  No Known Allergies    Medication Review:     Current Outpatient Medications:   •  aspirin 81 MG EC tablet, ADULT ASPIRIN EC LOW STRENGTH 81 MG ORAL TABLET DELAYED RELEASE, Disp: , Rfl:   •  atorvastatin (LIPITOR) 80 MG tablet, Take 80 mg by mouth Daily., Disp: , Rfl:   •  Biotin 26602 MCG tablet, Take  by mouth., Disp: , Rfl:   •  Cetirizine HCl (ZyrTEC Allergy) 10 MG capsule, Take  by mouth., Disp: , Rfl:   •  cholecalciferol (VITAMIN D3) 25 MCG (1000 UT) tablet, D3-1000 1000 UNIT TABS, Disp: , Rfl:   •  docusate sodium (COLACE) 50 MG capsule, Take  by mouth 2 (Two) Times a Day., Disp: , Rfl:   •  famotidine (PEPCID) 40 MG tablet, Take 1 tablet by mouth every night at bedtime., Disp: , Rfl:   •  gabapentin (NEURONTIN) 400 MG capsule, Take 1 capsule by mouth 3 (Three) Times a Day., Disp: , Rfl:   •  glucosamine sulfate 500 MG capsule capsule, Take  by mouth 3 (Three) Times a Day With Meals., Disp: , Rfl:   •  metoprolol tartrate (LOPRESSOR) 25 MG tablet, Take 1 tablet by mouth 2 (Two) Times a Day., Disp: 60 tablet, Rfl: 3  •  multivitamin " with minerals tablet tablet, HAIR SKIN AND NAILS FORMULA TABS, Disp: , Rfl:   •  pantoprazole (PROTONIX) 40 MG EC tablet, 2 (Two) Times a Day., Disp: , Rfl:   •  Polyethylene Glycol 3350 (MIRALAX PO), Take  by mouth., Disp: , Rfl:   •  rivaroxaban (XARELTO) 20 MG tablet, Take 1 tablet by mouth Daily., Disp: 90 tablet, Rfl: 3  •  traMADol (ULTRAM) 50 MG tablet, Take 50 mg by mouth., Disp: , Rfl:   •  albuterol sulfate  (90 Base) MCG/ACT inhaler, Inhale 2 puffs., Disp: , Rfl:     Family History:  Family History   Problem Relation Age of Onset   • Heart disease Father        Past Medical History:  Past Medical History:   Diagnosis Date   • Hyperlipidemia    • Hypertension        Past surgical History:  Past Surgical History:   Procedure Laterality Date   • GALLBLADDER SURGERY     • HYSTERECTOMY     • KNEE ARTHROPLASTY, PARTIAL REPLACEMENT         Social History:  Social History     Socioeconomic History   • Marital status:    Tobacco Use   • Smoking status: Former   • Smokeless tobacco: Never   Substance and Sexual Activity   • Alcohol use: Yes   • Drug use: Never       Review of Systems:  The following systems were reviewed as they relate to the cardiovascular system: Constitutional, Eyes, ENT, Cardiovascular, Respiratory, Gastrointestinal, Integumentary, Neurological, Psychiatric, Hematologic, Endocrine, Musculoskeletal, and Genitourinary. The pertinent cardiovascular findings are reported above with all other cardiovascular points within those systems being negative.    Diagnostic Study Review:     Current Electrocardiogram:    ECG 12 Lead    Date/Time: 11/3/2022 2:44 PM  Performed by: Myra Torres MD  Authorized by: Myra Torres MD   Comparison: compared with previous ECG   Similar to previous ECG  Rhythm: sinus rhythm  Rate: normal  BPM: 64  Conduction: conduction normal  QRS axis: normal  Other findings: non-specific ST-T wave changes    Clinical impression: abnormal  EKG              NOTE: The following portions of the patient's history were reviewed and updated this visit as appropriate: allergies, current medications, past family history, past medical history, past social history, past surgical history and problem list.

## 2022-12-29 ENCOUNTER — OFFICE (AMBULATORY)
Dept: URBAN - METROPOLITAN AREA CLINIC 64 | Facility: CLINIC | Age: 72
End: 2022-12-29

## 2022-12-29 VITALS
DIASTOLIC BLOOD PRESSURE: 70 MMHG | HEIGHT: 64 IN | HEART RATE: 66 BPM | SYSTOLIC BLOOD PRESSURE: 116 MMHG | WEIGHT: 161 LBS

## 2022-12-29 DIAGNOSIS — K21.9 GASTRO-ESOPHAGEAL REFLUX DISEASE WITHOUT ESOPHAGITIS: ICD-10-CM

## 2022-12-29 DIAGNOSIS — K59.00 CONSTIPATION, UNSPECIFIED: ICD-10-CM

## 2022-12-29 DIAGNOSIS — F45.8 OTHER SOMATOFORM DISORDERS: ICD-10-CM

## 2022-12-29 DIAGNOSIS — R14.0 ABDOMINAL DISTENSION (GASEOUS): ICD-10-CM

## 2022-12-29 PROCEDURE — 99213 OFFICE O/P EST LOW 20 MIN: CPT | Performed by: INTERNAL MEDICINE

## 2023-04-25 RX ORDER — AZELASTINE 1 MG/ML
SPRAY, METERED NASAL
COMMUNITY
Start: 2023-01-10

## 2023-04-25 RX ORDER — CEPHALEXIN 500 MG/1
1 CAPSULE ORAL EVERY 12 HOURS SCHEDULED
COMMUNITY
Start: 2022-12-28 | End: 2023-05-04

## 2023-05-04 ENCOUNTER — OFFICE VISIT (OUTPATIENT)
Dept: CARDIOLOGY | Facility: CLINIC | Age: 73
End: 2023-05-04
Payer: MEDICARE

## 2023-05-04 VITALS
BODY MASS INDEX: 26.98 KG/M2 | SYSTOLIC BLOOD PRESSURE: 134 MMHG | OXYGEN SATURATION: 99 % | HEART RATE: 57 BPM | RESPIRATION RATE: 18 BRPM | WEIGHT: 158 LBS | HEIGHT: 64 IN | DIASTOLIC BLOOD PRESSURE: 76 MMHG

## 2023-05-04 DIAGNOSIS — I48.0 PAROXYSMAL ATRIAL FIBRILLATION: ICD-10-CM

## 2023-05-04 DIAGNOSIS — I10 PRIMARY HYPERTENSION: ICD-10-CM

## 2023-05-04 DIAGNOSIS — I25.10 CORONARY ARTERY DISEASE INVOLVING NATIVE CORONARY ARTERY OF NATIVE HEART WITHOUT ANGINA PECTORIS: Primary | ICD-10-CM

## 2023-05-04 DIAGNOSIS — E78.2 MIXED HYPERLIPIDEMIA: ICD-10-CM

## 2023-05-04 PROCEDURE — 3075F SYST BP GE 130 - 139MM HG: CPT | Performed by: INTERNAL MEDICINE

## 2023-05-04 PROCEDURE — 99214 OFFICE O/P EST MOD 30 MIN: CPT | Performed by: INTERNAL MEDICINE

## 2023-05-04 PROCEDURE — 1159F MED LIST DOCD IN RCRD: CPT | Performed by: INTERNAL MEDICINE

## 2023-05-04 PROCEDURE — 1160F RVW MEDS BY RX/DR IN RCRD: CPT | Performed by: INTERNAL MEDICINE

## 2023-05-04 PROCEDURE — 3078F DIAST BP <80 MM HG: CPT | Performed by: INTERNAL MEDICINE

## 2023-05-04 PROCEDURE — 93000 ELECTROCARDIOGRAM COMPLETE: CPT | Performed by: INTERNAL MEDICINE

## 2023-05-04 RX ORDER — TRAMADOL HYDROCHLORIDE 50 MG/1
50 TABLET ORAL
COMMUNITY
Start: 2023-04-12 | End: 2023-05-04 | Stop reason: SDUPTHER

## 2023-05-04 RX ORDER — GABAPENTIN 400 MG/1
400 CAPSULE ORAL 3 TIMES DAILY
COMMUNITY
Start: 2023-04-12 | End: 2023-05-04 | Stop reason: SDUPTHER

## 2023-05-04 RX ORDER — AZELASTINE 1 MG/ML
1-2 SPRAY, METERED NASAL
COMMUNITY
Start: 2023-01-10 | End: 2023-05-04 | Stop reason: SDUPTHER

## 2023-05-04 RX ORDER — METHOCARBAMOL 500 MG/1
TABLET, FILM COATED ORAL SEE ADMIN INSTRUCTIONS
COMMUNITY
Start: 2023-05-01

## 2023-05-04 NOTE — PROGRESS NOTES
Cardiology Office Visit      Encounter Date:  05/04/2023    Patient ID:   Nesha Roman is a 72 y.o. female.    Reason For Followup:  Paroxysmal atrial fibrillation  Hypertension  Hyperlipidemia  Coronary artery disease  Preop evaluation for back surgery    Brief Clinical History:  Dear Shanell Mckeon APRN    I had the pleasure of seeing Nesha Roman today. As you are well aware, this is a 72 y.o. female past medical history that is significant for history of atrial fibrillation hypertension hyperlipidemia none coronary artery disease here to establish care    Interval History:  Denies any new cardiac symptoms  Denies any new cardiac symptoms  Intermittent palpitations secondary to proximal atrial fibrillation otherwise no symptoms  Atrial fibrillation is well controlled  Denies any symptoms of chest pain shortness of breath dizziness or syncope    Assessment & Plan    Impressions:  Paroxysmal atrial fibrillation/maintaining sinus rhythm  Hypertension/pressures controlled  Hyperlipidemia/controlled on high-dose statins will check lipids  Coronary artery disease/catheterization last year with a moderate obstructive disease involving the LAD with a negative FFR  Normal LV systolic function  Moderate obstructive disease involving LAD    Recommendations:  Continue current medical therapy with aspirin and anticoagulation therapy with Xarelto  Risk benefits and alternatives for long-term oral anticoagulation reviewed and discussed with the patient  Continue current therapy with high-dose statin  Continue current dose of beta-blockers  Check labs including lipid profile  Prior cardiac findings including echocardiogram Findings and labs from before during last hospitalization including CT abdomen chest reviewed and discussed with the patient  Continue current medical therapy with Xarelto 20 mg p.o. once a day metoprolol 25 mg p.o. twice daily Lipitor 80 mg p.o. once a day aspirin 81 mg p.o. once a day  Recent  "labs and work-up reviewed and discussed with the patient  From cardiac standpoint patient is low risk to undergo the planned surgical procedure on the back  Okay to hold Xarelto as recommended by surgical team for the procedure  Follow-up in office in 6 months      Objective:    Vitals:  Vitals:    05/04/23 1352   BP: 134/76   BP Location: Left arm   Patient Position: Sitting   Cuff Size: Large Adult   Pulse: 57   Resp: 18   SpO2: 99%   Weight: 71.7 kg (158 lb)   Height: 162.6 cm (64\")       Physical Exam:    General: Alert, cooperative, no distress, appears stated age  Head:  Normocephalic, atraumatic, mucous membranes moist  Eyes:  Conjunctiva/corneas clear, EOM's intact     Neck:  Supple,  no adenopathy;      Lungs: Clear to auscultation bilaterally, no wheezes rhonchi rales are noted  Chest wall: No tenderness  Heart::  Regular rate and rhythm, S1 and S2 normal, no murmur, rub or gallop  Abdomen: Soft, non-tender, nondistended bowel sounds active  Extremities: No cyanosis, clubbing, or edema  Pulses: 2+ and symmetric all extremities  Skin:  No rashes or lesions  Neuro/psych: A&O x3. CN II through XII are grossly intact with appropriate affect      Allergies:  No Known Allergies    Medication Review:     Current Outpatient Medications:   •  aspirin 81 MG EC tablet, ADULT ASPIRIN EC LOW STRENGTH 81 MG ORAL TABLET DELAYED RELEASE, Disp: , Rfl:   •  atorvastatin (LIPITOR) 80 MG tablet, Take 1 tablet by mouth Daily., Disp: , Rfl:   •  azelastine (ASTELIN) 0.1 % nasal spray, USE 1 TO 2 SPRAY(S) IN EACH NOSTRIL TWICE DAILY AS DIRECTED, Disp: , Rfl:   •  Biotin 67604 MCG tablet, Take  by mouth., Disp: , Rfl:   •  Cetirizine HCl (ZyrTEC Allergy) 10 MG capsule, Take  by mouth., Disp: , Rfl:   •  cholecalciferol (VITAMIN D3) 25 MCG (1000 UT) tablet, D3-1000 1000 UNIT TABS, Disp: , Rfl:   •  Cholecalciferol 25 MCG (1000 UT) capsule, Take 1 capsule by mouth Daily., Disp: , Rfl:   •  docusate sodium (COLACE) 50 MG capsule, " Take  by mouth 2 (Two) Times a Day., Disp: , Rfl:   •  famotidine (PEPCID) 40 MG tablet, Take 1 tablet by mouth every night at bedtime., Disp: , Rfl:   •  gabapentin (NEURONTIN) 400 MG capsule, Take 1 capsule by mouth 3 (Three) Times a Day., Disp: , Rfl:   •  glucosamine sulfate 500 MG capsule capsule, Take  by mouth 3 (Three) Times a Day With Meals., Disp: , Rfl:   •  methocarbamol (ROBAXIN) 500 MG tablet, Take  by mouth See Admin Instructions. Take 1 tablet by mouth every 4-6 hours as needed for pain, Disp: , Rfl:   •  metoprolol tartrate (LOPRESSOR) 25 MG tablet, Take 1 tablet by mouth twice daily, Disp: 60 tablet, Rfl: 0  •  multivitamin with minerals tablet tablet, HAIR SKIN AND NAILS FORMULA TABS, Disp: , Rfl:   •  pantoprazole (PROTONIX) 40 MG EC tablet, 2 (Two) Times a Day., Disp: , Rfl:   •  Polyethylene Glycol 3350 (MIRALAX PO), Take  by mouth., Disp: , Rfl:   •  rivaroxaban (XARELTO) 20 MG tablet, Take 1 tablet by mouth Daily., Disp: 90 tablet, Rfl: 3  •  traMADol (ULTRAM) 50 MG tablet, Take 1 tablet by mouth., Disp: , Rfl:     Family History:  Family History   Problem Relation Age of Onset   • Heart disease Father         CHF       Past Medical History:  Past Medical History:   Diagnosis Date   • Atrial fibrillation 07/2021   • COPD (chronic obstructive pulmonary disease) 052022   • Deep vein thrombosis    • Hyperlipidemia    • Hypertension    • Sleep apnea 2021       Past surgical History:  Past Surgical History:   Procedure Laterality Date   • CARDIAC CATHETERIZATION     • GALLBLADDER SURGERY     • HYSTERECTOMY     • KNEE ARTHROPLASTY, PARTIAL REPLACEMENT         Social History:  Social History     Socioeconomic History   • Marital status:    Tobacco Use   • Smoking status: Former     Years: 15.00     Types: Cigarettes   • Smokeless tobacco: Never   Vaping Use   • Vaping Use: Never used   Substance and Sexual Activity   • Alcohol use: Yes     Alcohol/week: 2.0 standard drinks      Types: 1 Glasses of wine, 1 Cans of beer per week     Comment: Hardly ever drink   • Drug use: Never   • Sexual activity: Yes     Partners: Male     Birth control/protection: Post-menopausal, Hysterectomy       Review of Systems:  The following systems were reviewed as they relate to the cardiovascular system: Constitutional, Eyes, ENT, Cardiovascular, Respiratory, Gastrointestinal, Integumentary, Neurological, Psychiatric, Hematologic, Endocrine, Musculoskeletal, and Genitourinary. The pertinent cardiovascular findings are reported above with all other cardiovascular points within those systems being negative.    Diagnostic Study Review:     Current Electrocardiogram:    ECG 12 Lead    Date/Time: 5/4/2023 2:39 PM  Performed by: Myra Torres MD  Authorized by: Myra Torres MD   Comparison: compared with previous ECG   Similar to previous ECG  Rhythm: sinus rhythm  Rate: normal  BPM: 57  Conduction: conduction normal  QRS axis: normal  Other findings: non-specific ST-T wave changes    Clinical impression: abnormal EKG              NOTE: The following portions of the patient's history were reviewed and updated this visit as appropriate: allergies, current medications, past family history, past medical history, past social history, past surgical history and problem list.

## 2023-11-16 ENCOUNTER — OFFICE VISIT (OUTPATIENT)
Dept: CARDIOLOGY | Facility: CLINIC | Age: 73
End: 2023-11-16
Payer: MEDICARE

## 2023-11-16 VITALS
OXYGEN SATURATION: 97 % | SYSTOLIC BLOOD PRESSURE: 123 MMHG | BODY MASS INDEX: 27.83 KG/M2 | WEIGHT: 163 LBS | HEART RATE: 81 BPM | HEIGHT: 64 IN | DIASTOLIC BLOOD PRESSURE: 77 MMHG

## 2023-11-16 DIAGNOSIS — I48.0 PAROXYSMAL ATRIAL FIBRILLATION: Primary | ICD-10-CM

## 2023-11-16 DIAGNOSIS — I10 PRIMARY HYPERTENSION: ICD-10-CM

## 2023-11-16 DIAGNOSIS — I25.10 CORONARY ARTERY DISEASE INVOLVING NATIVE CORONARY ARTERY OF NATIVE HEART WITHOUT ANGINA PECTORIS: ICD-10-CM

## 2023-11-16 DIAGNOSIS — E78.2 MIXED HYPERLIPIDEMIA: ICD-10-CM

## 2023-11-16 PROCEDURE — 1160F RVW MEDS BY RX/DR IN RCRD: CPT | Performed by: INTERNAL MEDICINE

## 2023-11-16 PROCEDURE — 99214 OFFICE O/P EST MOD 30 MIN: CPT | Performed by: INTERNAL MEDICINE

## 2023-11-16 PROCEDURE — 93000 ELECTROCARDIOGRAM COMPLETE: CPT | Performed by: INTERNAL MEDICINE

## 2023-11-16 PROCEDURE — 1159F MED LIST DOCD IN RCRD: CPT | Performed by: INTERNAL MEDICINE

## 2023-11-16 PROCEDURE — 3078F DIAST BP <80 MM HG: CPT | Performed by: INTERNAL MEDICINE

## 2023-11-16 PROCEDURE — 3074F SYST BP LT 130 MM HG: CPT | Performed by: INTERNAL MEDICINE

## 2023-11-16 NOTE — PROGRESS NOTES
Cardiology Office Visit      Encounter Date:  11/16/2023    Patient ID:   Nesha Roman is a 73 y.o. female.  Reason For Followup:  Paroxysmal atrial fibrillation  Hypertension  Hyperlipidemia  Coronary artery disease      Brief Clinical History:  Dear Shanell Mckeon APRN    I had the pleasure of seeing Nesha Roman today. As you are well aware, this is a 73 y.o. female past medical history that is significant for history of atrial fibrillation hypertension hyperlipidemia none coronary artery disease here to establish care    Interval History:  Denies any new cardiac symptoms  Denies any new cardiac symptoms  Intermittent palpitations secondary to proximal atrial fibrillation otherwise no symptoms  Atrial fibrillation is well controlled  Denies any symptoms of chest pain shortness of breath dizziness or syncope    Assessment & Plan    Impressions:  Paroxysmal atrial fibrillation/maintaining sinus rhythm  Hypertension/pressures controlled  Hyperlipidemia/controlled on high-dose statins will check lipids  Coronary artery disease/catheterization last year with a moderate obstructive disease involving the LAD with a negative FFR  Normal LV systolic function  Moderate obstructive disease involving LAD    Recommendations:  Continue current medical therapy with aspirin and anticoagulation therapy with Xarelto  Risk benefits and alternatives for long-term oral anticoagulation reviewed and discussed with the patient  Continue current therapy with high-dose statin  Continue current dose of beta-blockers  Check labs including lipid profile  Prior cardiac findings including echocardiogram Findings and labs from before during last hospitalization including CT abdomen chest reviewed and discussed with the patient  Continue current medical therapy with Xarelto 20 mg p.o. once a day metoprolol 25 mg p.o. twice daily Lipitor 80 mg p.o. once a day aspirin 81 mg p.o. once a day  Recent labs and work-up reviewed and  "discussed with the patient    Follow-up in office in 6 months        Vitals:  Vitals:    11/16/23 1329   BP: 123/77   BP Location: Left arm   Patient Position: Sitting   Pulse: 81   SpO2: 97%   Weight: 73.9 kg (163 lb)   Height: 162.6 cm (64\")       Physical Exam:    General: Alert, cooperative, no distress, appears stated age  Head:  Normocephalic, atraumatic, mucous membranes moist  Eyes:  Conjunctiva/corneas clear, EOM's intact     Neck:  Supple,  no adenopathy;      Lungs: Clear to auscultation bilaterally, no wheezes rhonchi rales are noted  Chest wall: No tenderness  Heart::  Regular rate and rhythm, S1 and S2 normal, no murmur, rub or gallop  Abdomen: Soft, non-tender, nondistended bowel sounds active  Extremities: No cyanosis, clubbing, or edema  Pulses: 2+ and symmetric all extremities  Skin:  No rashes or lesions  Neuro/psych: A&O x3. CN II through XII are grossly intact with appropriate affect              Lab Results   Component Value Date    BUN 24 (H) 03/19/2021    CREATININE 0.8 03/19/2021    BCR 30.0 (H) 03/19/2021    K 5.0 03/24/2021    CO2 25 03/19/2021    CALCIUM 8.9 03/19/2021    ALBUMIN 4.0 03/19/2021    BILITOT 0.3 03/19/2021    AST 25 03/19/2021    ALT 13 03/19/2021        No results found for this or any previous visit.     Lab Results   Component Value Date    CHLPL 134 06/18/2020    TRIG 38 06/18/2020    HDL 68 06/18/2020    LDL 58 06/18/2020                Objective:          Allergies:  No Known Allergies    Medication Review:     Current Outpatient Medications:     aspirin 81 MG EC tablet, ADULT ASPIRIN EC LOW STRENGTH 81 MG ORAL TABLET DELAYED RELEASE, Disp: , Rfl:     atorvastatin (LIPITOR) 80 MG tablet, Take 1 tablet by mouth Daily., Disp: , Rfl:     azelastine (ASTELIN) 0.1 % nasal spray, USE 1 TO 2 SPRAY(S) IN EACH NOSTRIL TWICE DAILY AS DIRECTED, Disp: , Rfl:     Biotin 96310 MCG tablet, Take  by mouth., Disp: , Rfl:     Cetirizine HCl (ZyrTEC Allergy) 10 MG capsule, Take  by " mouth., Disp: , Rfl:     cholecalciferol (VITAMIN D3) 25 MCG (1000 UT) tablet, D3-1000 1000 UNIT TABS, Disp: , Rfl:     docusate sodium (COLACE) 50 MG capsule, Take  by mouth 2 (Two) Times a Day., Disp: , Rfl:     gabapentin (NEURONTIN) 400 MG capsule, Take 1 capsule by mouth 3 (Three) Times a Day., Disp: , Rfl:     glucosamine sulfate 500 MG capsule capsule, Take  by mouth 3 (Three) Times a Day With Meals., Disp: , Rfl:     methocarbamol (ROBAXIN) 500 MG tablet, Take  by mouth See Admin Instructions. Take 1 tablet by mouth every 4-6 hours as needed for pain, Disp: , Rfl:     metoprolol tartrate (LOPRESSOR) 25 MG tablet, Take 1 tablet by mouth 2 (Two) Times a Day., Disp: 180 tablet, Rfl: 3    multivitamin with minerals tablet tablet, HAIR SKIN AND NAILS FORMULA TABS, Disp: , Rfl:     pantoprazole (PROTONIX) 40 MG EC tablet, 2 (Two) Times a Day., Disp: , Rfl:     Polyethylene Glycol 3350 (MIRALAX PO), Take  by mouth., Disp: , Rfl:     rivaroxaban (XARELTO) 20 MG tablet, Take 1 tablet by mouth Daily., Disp: 90 tablet, Rfl: 3    traMADol (ULTRAM) 50 MG tablet, Take 1 tablet by mouth., Disp: , Rfl:     Family History:  Family History   Problem Relation Age of Onset    Heart disease Father         CHF       Past Medical History:  Past Medical History:   Diagnosis Date    Atrial fibrillation 07/2021    COPD (chronic obstructive pulmonary disease) 052022    Deep vein thrombosis     Hyperlipidemia     Hypertension     Sleep apnea 2021       Past surgical History:  Past Surgical History:   Procedure Laterality Date    CARDIAC CATHETERIZATION      GALLBLADDER SURGERY      HYSTERECTOMY      KNEE ARTHROPLASTY, PARTIAL REPLACEMENT         Social History:  Social History     Socioeconomic History    Marital status:    Tobacco Use    Smoking status: Former     Years: 15     Types: Cigarettes    Smokeless tobacco: Never   Vaping Use    Vaping Use: Never used   Substance and Sexual Activity    Alcohol use: Yes      Alcohol/week: 2.0 standard drinks of alcohol     Types: 1 Glasses of wine, 1 Cans of beer per week     Comment: Hardly ever drink    Drug use: Never    Sexual activity: Yes     Partners: Male     Birth control/protection: Post-menopausal, Hysterectomy       Review of Systems:  The following systems were reviewed as they relate to the cardiovascular system: Constitutional, Eyes, ENT, Cardiovascular, Respiratory, Gastrointestinal, Integumentary, Neurological, Psychiatric, Hematologic, Endocrine, Musculoskeletal, and Genitourinary. The pertinent cardiovascular findings are reported above with all other cardiovascular points within those systems being negative.    Diagnostic Study Review:     Current Electrocardiogram:    ECG 12 Lead    Date/Time: 11/16/2023 2:59 PM  Performed by: Myra Torres MD    Authorized by: Myra Torres MD  Comparison: compared with previous ECG   Similar to previous ECG  Rhythm: sinus rhythm  Rate: normal  BPM: 72  Conduction: conduction normal  QRS axis: normal  Other findings: non-specific ST-T wave changes    Clinical impression: abnormal EKG                NOTE: The following portions of the patient's history were reviewed and updated this visit as appropriate: allergies, current medications, past family history, past medical history, past social history, past surgical history and problem list.

## 2024-05-16 ENCOUNTER — OFFICE VISIT (OUTPATIENT)
Dept: CARDIOLOGY | Facility: CLINIC | Age: 74
End: 2024-05-16
Payer: MEDICARE

## 2024-05-16 VITALS
OXYGEN SATURATION: 97 % | HEART RATE: 66 BPM | BODY MASS INDEX: 27.83 KG/M2 | DIASTOLIC BLOOD PRESSURE: 60 MMHG | SYSTOLIC BLOOD PRESSURE: 98 MMHG | WEIGHT: 163 LBS | HEIGHT: 64 IN

## 2024-05-16 DIAGNOSIS — I25.10 CORONARY ARTERY DISEASE INVOLVING NATIVE CORONARY ARTERY OF NATIVE HEART WITHOUT ANGINA PECTORIS: ICD-10-CM

## 2024-05-16 DIAGNOSIS — I10 PRIMARY HYPERTENSION: ICD-10-CM

## 2024-05-16 DIAGNOSIS — E78.2 MIXED HYPERLIPIDEMIA: ICD-10-CM

## 2024-05-16 DIAGNOSIS — I48.0 PAROXYSMAL ATRIAL FIBRILLATION: Primary | ICD-10-CM

## 2024-05-16 PROCEDURE — 1160F RVW MEDS BY RX/DR IN RCRD: CPT | Performed by: INTERNAL MEDICINE

## 2024-05-16 PROCEDURE — 3078F DIAST BP <80 MM HG: CPT | Performed by: INTERNAL MEDICINE

## 2024-05-16 PROCEDURE — 1159F MED LIST DOCD IN RCRD: CPT | Performed by: INTERNAL MEDICINE

## 2024-05-16 PROCEDURE — 93000 ELECTROCARDIOGRAM COMPLETE: CPT | Performed by: INTERNAL MEDICINE

## 2024-05-16 PROCEDURE — 3074F SYST BP LT 130 MM HG: CPT | Performed by: INTERNAL MEDICINE

## 2024-05-16 PROCEDURE — 99214 OFFICE O/P EST MOD 30 MIN: CPT | Performed by: INTERNAL MEDICINE

## 2024-05-16 NOTE — PROGRESS NOTES
Cardiology Office Visit      Encounter Date:  05/16/2024    Patient ID:   Nesha Roman is a 73 y.o. female.    Reason For Followup:  Paroxysmal atrial fibrillation  Hypertension  Hyperlipidemia  Coronary artery disease      Brief Clinical History:  Dear Shanell Mckeon APRN    I had the pleasure of seeing Nesha Roman today. As you are well aware, this is a 73 y.o. female past medical history that is significant for history of atrial fibrillation hypertension hyperlipidemia none coronary artery disease here to establish care    Interval History:  Denies any new cardiac symptoms  Denies any new cardiac symptoms  Intermittent palpitations secondary to proximal atrial fibrillation otherwise no symptoms  Atrial fibrillation is well controlled  Denies any symptoms of chest pain shortness of breath dizziness or syncope    Assessment & Plan    Impressions:  Paroxysmal atrial fibrillation/maintaining sinus rhythm  Hypertension/pressures controlled  Hyperlipidemia/controlled on high-dose statins will check lipids  Coronary artery disease/catheterization last year with a moderate obstructive disease involving the LAD with a negative FFR  Normal LV systolic function  Moderate obstructive disease involving LAD    Recommendations:  Continue current medical therapy with aspirin and anticoagulation therapy with Xarelto  Risk benefits and alternatives for long-term oral anticoagulation reviewed and discussed with the patient  Continue current therapy with high-dose statin  Continue current dose of beta-blockers  Check labs including lipid profile  Prior cardiac findings including echocardiogram Findings and labs from before during last hospitalization including CT abdomen chest reviewed and discussed with the patient  Continue current medical therapy with Xarelto 20 mg p.o. once a day metoprolol 25 mg p.o. twice daily Lipitor 80 mg p.o. once a day aspirin 81 mg p.o. once a day  Labs with primary care physician  "office  Prior medical records reviewed and discussed with patient  No new cardiac symptoms      Follow-up in office in 6 months        Vitals:  Vitals:    05/16/24 1408   BP: 98/60   Pulse: 66   SpO2: 97%   Weight: 73.9 kg (163 lb)   Height: 162.6 cm (64\")       Physical Exam:    General: Alert, cooperative, no distress, appears stated age  Head:  Normocephalic, atraumatic, mucous membranes moist  Eyes:  Conjunctiva/corneas clear, EOM's intact     Neck:  Supple,  no adenopathy;      Lungs: Clear to auscultation bilaterally, no wheezes rhonchi rales are noted  Chest wall: No tenderness  Heart::  Regular rate and rhythm, S1 and S2 normal, no murmur, rub or gallop  Abdomen: Soft, non-tender, nondistended bowel sounds active  Extremities: No cyanosis, clubbing, or edema  Pulses: 2+ and symmetric all extremities  Skin:  No rashes or lesions  Neuro/psych: A&O x3. CN II through XII are grossly intact with appropriate affect              Lab Results   Component Value Date    BUN 12 07/09/2021    CREATININE 0.84 07/09/2021    BCR 14.3 07/09/2021    K 3.6 07/09/2021    CO2 23 07/09/2021    CALCIUM 7.9 (L) 07/09/2021    ALBUMIN 2.9 (L) 07/09/2021    BILITOT 0.2 07/09/2021    AST 25 07/09/2021    ALT 17 07/09/2021        No results found for this or any previous visit.     Lab Results   Component Value Date    CHLPL 134 06/18/2020    TRIG 38 06/18/2020    HDL 68 06/18/2020    LDL 58 06/18/2020                Objective:          Allergies:  No Known Allergies    Medication Review:     Current Outpatient Medications:     apixaban (ELIQUIS) 5 MG tablet tablet, Take 1 tablet by mouth 2 (Two) Times a Day., Disp: 180 tablet, Rfl: 2    aspirin 81 MG EC tablet, ADULT ASPIRIN EC LOW STRENGTH 81 MG ORAL TABLET DELAYED RELEASE, Disp: , Rfl:     atorvastatin (LIPITOR) 80 MG tablet, Take 1 tablet by mouth Daily., Disp: , Rfl:     azelastine (ASTELIN) 0.1 % nasal spray, USE 1 TO 2 SPRAY(S) IN EACH NOSTRIL TWICE DAILY AS DIRECTED, Disp: , " Rfl:     Biotin 43609 MCG tablet, Take  by mouth., Disp: , Rfl:     Cetirizine HCl (ZyrTEC Allergy) 10 MG capsule, Take  by mouth., Disp: , Rfl:     cholecalciferol (VITAMIN D3) 25 MCG (1000 UT) tablet, D3-1000 1000 UNIT TABS, Disp: , Rfl:     docusate sodium (COLACE) 50 MG capsule, Take  by mouth 2 (Two) Times a Day., Disp: , Rfl:     gabapentin (NEURONTIN) 400 MG capsule, Take 1 capsule by mouth 3 (Three) Times a Day., Disp: , Rfl:     glucosamine sulfate 500 MG capsule capsule, Take  by mouth 3 (Three) Times a Day With Meals., Disp: , Rfl:     methocarbamol (ROBAXIN) 500 MG tablet, Take  by mouth See Admin Instructions. Take 1 tablet by mouth every 4-6 hours as needed for pain, Disp: , Rfl:     metoprolol tartrate (LOPRESSOR) 25 MG tablet, Take 1 tablet by mouth 2 (Two) Times a Day., Disp: 180 tablet, Rfl: 2    multivitamin with minerals tablet tablet, HAIR SKIN AND NAILS FORMULA TABS, Disp: , Rfl:     pantoprazole (PROTONIX) 40 MG EC tablet, 2 (Two) Times a Day., Disp: , Rfl:     Polyethylene Glycol 3350 (MIRALAX PO), Take  by mouth., Disp: , Rfl:     traMADol (ULTRAM) 50 MG tablet, Take 1 tablet by mouth., Disp: , Rfl:     Family History:  Family History   Problem Relation Age of Onset    Heart disease Father         CHF       Past Medical History:  Past Medical History:   Diagnosis Date    Atrial fibrillation 07/2021    COPD (chronic obstructive pulmonary disease) 052022    Deep vein thrombosis     Hyperlipidemia     Hypertension     Sleep apnea 2021       Past surgical History:  Past Surgical History:   Procedure Laterality Date    CARDIAC CATHETERIZATION      GALLBLADDER SURGERY      HYSTERECTOMY      KNEE ARTHROPLASTY, PARTIAL REPLACEMENT         Social History:  Social History     Socioeconomic History    Marital status:    Tobacco Use    Smoking status: Former     Types: Cigarettes    Smokeless tobacco: Never   Vaping Use    Vaping status: Never Used   Substance and Sexual Activity     Alcohol use: Yes     Alcohol/week: 2.0 standard drinks of alcohol     Types: 1 Glasses of wine, 1 Cans of beer per week     Comment: Hardly ever drink    Drug use: Never    Sexual activity: Yes     Partners: Male     Birth control/protection: Post-menopausal, Hysterectomy       Review of Systems:  The following systems were reviewed as they relate to the cardiovascular system: Constitutional, Eyes, ENT, Cardiovascular, Respiratory, Gastrointestinal, Integumentary, Neurological, Psychiatric, Hematologic, Endocrine, Musculoskeletal, and Genitourinary. The pertinent cardiovascular findings are reported above with all other cardiovascular points within those systems being negative.    Diagnostic Study Review:     Current Electrocardiogram:    ECG 12 Lead    Date/Time: 5/16/2024 2:51 PM  Performed by: Myra Torres MD    Authorized by: Myra Torres MD  Comparison: compared with previous ECG   Similar to previous ECG  Rhythm: sinus rhythm  Rate: normal  BPM: 66  Conduction: conduction normal  QRS axis: normal  Other findings: non-specific ST-T wave changes    Clinical impression: abnormal EKG                NOTE: The following portions of the patient's history were reviewed and updated this visit as appropriate: allergies, current medications, past family history, past medical history, past social history, past surgical history and problem list.

## 2024-10-24 RX ORDER — METOPROLOL TARTRATE 25 MG/1
25 TABLET, FILM COATED ORAL 2 TIMES DAILY
Qty: 180 TABLET | Refills: 2 | Status: SHIPPED | OUTPATIENT
Start: 2024-10-24

## 2024-11-14 ENCOUNTER — OFFICE VISIT (OUTPATIENT)
Dept: CARDIOLOGY | Facility: CLINIC | Age: 74
End: 2024-11-14
Payer: MEDICARE

## 2024-11-14 VITALS
HEIGHT: 64 IN | BODY MASS INDEX: 27.86 KG/M2 | OXYGEN SATURATION: 98 % | SYSTOLIC BLOOD PRESSURE: 119 MMHG | HEART RATE: 62 BPM | DIASTOLIC BLOOD PRESSURE: 71 MMHG | WEIGHT: 163.2 LBS

## 2024-11-14 DIAGNOSIS — I10 PRIMARY HYPERTENSION: ICD-10-CM

## 2024-11-14 DIAGNOSIS — I48.0 PAROXYSMAL ATRIAL FIBRILLATION: ICD-10-CM

## 2024-11-14 DIAGNOSIS — I25.10 CORONARY ARTERY DISEASE INVOLVING NATIVE CORONARY ARTERY OF NATIVE HEART WITHOUT ANGINA PECTORIS: Primary | ICD-10-CM

## 2024-11-14 DIAGNOSIS — E78.2 MIXED HYPERLIPIDEMIA: ICD-10-CM

## 2024-11-14 PROCEDURE — 1160F RVW MEDS BY RX/DR IN RCRD: CPT | Performed by: INTERNAL MEDICINE

## 2024-11-14 PROCEDURE — 3074F SYST BP LT 130 MM HG: CPT | Performed by: INTERNAL MEDICINE

## 2024-11-14 PROCEDURE — 93000 ELECTROCARDIOGRAM COMPLETE: CPT | Performed by: INTERNAL MEDICINE

## 2024-11-14 PROCEDURE — 3078F DIAST BP <80 MM HG: CPT | Performed by: INTERNAL MEDICINE

## 2024-11-14 PROCEDURE — 1159F MED LIST DOCD IN RCRD: CPT | Performed by: INTERNAL MEDICINE

## 2024-11-14 PROCEDURE — 99214 OFFICE O/P EST MOD 30 MIN: CPT | Performed by: INTERNAL MEDICINE

## 2024-11-14 RX ORDER — OMEPRAZOLE 40 MG/1
40 CAPSULE, DELAYED RELEASE ORAL DAILY
COMMUNITY

## 2024-11-14 NOTE — PROGRESS NOTES
Cardiology Office Visit      Encounter Date:  11/14/2024    Patient ID:   Nesha Roman is a 74 y.o. female.    Reason For Followup:  Paroxysmal atrial fibrillation  Hypertension  Hyperlipidemia  Coronary artery disease      Brief Clinical History:  Dear Shanell Mckeon APRN    I had the pleasure of seeing Nesha Roman today. As you are well aware, this is a 74 y.o. female past medical history that is significant for history of atrial fibrillation hypertension hyperlipidemia none coronary artery disease here for follow-up    Interval History:  Denies any new cardiac symptoms  Denies any new cardiac symptoms  Intermittent palpitations secondary to proximal atrial fibrillation otherwise no symptoms  Atrial fibrillation is well controlled  Denies any symptoms of chest pain shortness of breath dizziness or syncope  Patient had a recent fall with a pelvic fracture  Complaining of some night sweats and weight loss  Assessment & Plan    Impressions:  Paroxysmal atrial fibrillation/maintaining sinus rhythm  Hypertension/pressures controlled  Hyperlipidemia/controlled on high-dose statins will check lipids  Coronary artery disease/catheterization last year with a moderate obstructive disease involving the LAD with a negative FFR  Normal LV systolic function  Moderate obstructive disease involving LAD    Recommendations:  Continue current medical therapy with aspirin and anticoagulation therapy with Xarelto  Risk benefits and alternatives for long-term oral anticoagulation reviewed and discussed with the patient  Continue current therapy with high-dose statin  Continue current dose of beta-blockers  Check labs including lipid profile  Continue current medical therapy with Xarelto 20 mg p.o. once a day metoprolol 25 mg p.o. twice daily Lipitor 80 mg p.o. once a day aspirin 81 mg p.o. once a day  Labs with primary care physician office  Prior medical records reviewed and discussed with patient  No new cardiac  "symptoms  Check thyroid function  Recent medical records from emergency room also CT findings reviewed and discussed with patient  Continue current medical therapy continue close monitoring  Follow-up in office in 6 months        Vitals:  Vitals:    11/14/24 1410   BP: 119/71   Pulse: 62   SpO2: 98%   Weight: 74 kg (163 lb 3.2 oz)   Height: 162.6 cm (64\")       Physical Exam:    General: Alert, cooperative, no distress, appears stated age  Head:  Normocephalic, atraumatic, mucous membranes moist  Eyes:  Conjunctiva/corneas clear, EOM's intact     Neck:  Supple,  no adenopathy;      Lungs: Clear to auscultation bilaterally, no wheezes rhonchi rales are noted  Chest wall: No tenderness  Heart::  Regular rate and rhythm, S1 and S2 normal, no murmur, rub or gallop  Abdomen: Soft, non-tender, nondistended bowel sounds active  Extremities: No cyanosis, clubbing, or edema  Pulses: 2+ and symmetric all extremities  Skin:  No rashes or lesions  Neuro/psych: A&O x3. CN II through XII are grossly intact with appropriate affect              Lab Results   Component Value Date    BUN 12 07/09/2021    CREATININE 0.84 07/09/2021    BCR 14.3 07/09/2021    K 3.6 07/09/2021    CO2 23 07/09/2021    CALCIUM 7.9 (L) 07/09/2021    ALBUMIN 2.9 (L) 07/09/2021    BILITOT 0.2 07/09/2021    AST 25 07/09/2021    ALT 17 07/09/2021        No results found for this or any previous visit.     Lab Results   Component Value Date    CHLPL 134 06/18/2020    TRIG 38 06/18/2020    HDL 68 06/18/2020    LDL 58 06/18/2020                Objective:          Allergies:  No Known Allergies    Medication Review:     Current Outpatient Medications:     aspirin 81 MG EC tablet, ADULT ASPIRIN EC LOW STRENGTH 81 MG ORAL TABLET DELAYED RELEASE, Disp: , Rfl:     atorvastatin (LIPITOR) 80 MG tablet, Take 1 tablet by mouth Daily., Disp: , Rfl:     azelastine (ASTELIN) 0.1 % nasal spray, USE 1 TO 2 SPRAY(S) IN EACH NOSTRIL TWICE DAILY AS DIRECTED, Disp: , Rfl:     " Biotin 91770 MCG tablet, Take  by mouth., Disp: , Rfl:     Cetirizine HCl (ZyrTEC Allergy) 10 MG capsule, Take  by mouth., Disp: , Rfl:     cholecalciferol (VITAMIN D3) 25 MCG (1000 UT) tablet, D3-1000 1000 UNIT TABS, Disp: , Rfl:     docusate sodium (COLACE) 50 MG capsule, Take  by mouth 2 (Two) Times a Day., Disp: , Rfl:     gabapentin (NEURONTIN) 400 MG capsule, Take 1 capsule by mouth 3 (Three) Times a Day., Disp: , Rfl:     metoprolol tartrate (LOPRESSOR) 25 MG tablet, TAKE 1 TABLET BY MOUTH TWICE DAILY, Disp: 180 tablet, Rfl: 2    multivitamin with minerals tablet tablet, HAIR SKIN AND NAILS FORMULA TABS, Disp: , Rfl:     omeprazole (priLOSEC) 40 MG capsule, Take 1 capsule by mouth Daily., Disp: , Rfl:     Polyethylene Glycol 3350 (MIRALAX PO), Take  by mouth., Disp: , Rfl:     rivaroxaban (XARELTO) 20 MG tablet, Take 1 tablet by mouth Daily., Disp: 90 tablet, Rfl: 1    traMADol (ULTRAM) 50 MG tablet, Take 1 tablet by mouth., Disp: , Rfl:     Family History:  Family History   Problem Relation Age of Onset    Heart disease Father         CHF       Past Medical History:  Past Medical History:   Diagnosis Date    Atrial fibrillation 07/2021    COPD (chronic obstructive pulmonary disease) 052022    Deep vein thrombosis     Hyperlipidemia     Hypertension     Sleep apnea 2021       Past surgical History:  Past Surgical History:   Procedure Laterality Date    CARDIAC CATHETERIZATION      GALLBLADDER SURGERY      HYSTERECTOMY      KNEE ARTHROPLASTY, PARTIAL REPLACEMENT         Social History:  Social History     Socioeconomic History    Marital status:    Tobacco Use    Smoking status: Former     Types: Cigarettes    Smokeless tobacco: Never   Vaping Use    Vaping status: Never Used   Substance and Sexual Activity    Alcohol use: Yes     Alcohol/week: 2.0 standard drinks of alcohol     Types: 1 Glasses of wine, 1 Cans of beer per week     Comment: Hardly ever drink    Drug use: Never    Sexual  activity: Yes     Partners: Male     Birth control/protection: Post-menopausal, Hysterectomy       Review of Systems:  The following systems were reviewed as they relate to the cardiovascular system: Constitutional, Eyes, ENT, Cardiovascular, Respiratory, Gastrointestinal, Integumentary, Neurological, Psychiatric, Hematologic, Endocrine, Musculoskeletal, and Genitourinary. The pertinent cardiovascular findings are reported above with all other cardiovascular points within those systems being negative.    Diagnostic Study Review:     Current Electrocardiogram:    ECG 12 Lead    Date/Time: 11/14/2024 2:55 PM  Performed by: Myra Torres MD    Authorized by: Myra Torres MD  Comparison: compared with previous ECG   Similar to previous ECG  Rhythm: sinus rhythm  Rate: normal  BPM: 62  Conduction: conduction normal  ST Segments: ST segments normal  T Waves: T waves normal  QRS axis: normal    Clinical impression: normal ECG                NOTE: The following portions of the patient's history were reviewed and updated this visit as appropriate: allergies, current medications, past family history, past medical history, past social history, past surgical history and problem list.

## 2024-12-18 RX ORDER — RIVAROXABAN 20 MG/1
20 TABLET, FILM COATED ORAL DAILY
Qty: 90 TABLET | Refills: 2 | Status: SHIPPED | OUTPATIENT
Start: 2024-12-18

## 2025-06-26 ENCOUNTER — OFFICE VISIT (OUTPATIENT)
Dept: CARDIOLOGY | Facility: CLINIC | Age: 75
End: 2025-06-26
Payer: MEDICARE

## 2025-06-26 VITALS
DIASTOLIC BLOOD PRESSURE: 67 MMHG | BODY MASS INDEX: 28.38 KG/M2 | WEIGHT: 166.2 LBS | HEIGHT: 64 IN | HEART RATE: 58 BPM | SYSTOLIC BLOOD PRESSURE: 115 MMHG | OXYGEN SATURATION: 96 %

## 2025-06-26 DIAGNOSIS — I10 PRIMARY HYPERTENSION: ICD-10-CM

## 2025-06-26 DIAGNOSIS — E78.2 MIXED HYPERLIPIDEMIA: ICD-10-CM

## 2025-06-26 DIAGNOSIS — I48.0 PAROXYSMAL ATRIAL FIBRILLATION: Primary | ICD-10-CM

## 2025-06-26 DIAGNOSIS — I25.10 CORONARY ARTERY DISEASE INVOLVING NATIVE CORONARY ARTERY OF NATIVE HEART WITHOUT ANGINA PECTORIS: ICD-10-CM

## 2025-06-26 DIAGNOSIS — Z83.3 FAMILY HISTORY OF DIABETES MELLITUS: ICD-10-CM

## 2025-06-26 PROCEDURE — 3074F SYST BP LT 130 MM HG: CPT | Performed by: INTERNAL MEDICINE

## 2025-06-26 PROCEDURE — 1160F RVW MEDS BY RX/DR IN RCRD: CPT | Performed by: INTERNAL MEDICINE

## 2025-06-26 PROCEDURE — 3078F DIAST BP <80 MM HG: CPT | Performed by: INTERNAL MEDICINE

## 2025-06-26 PROCEDURE — 93000 ELECTROCARDIOGRAM COMPLETE: CPT | Performed by: INTERNAL MEDICINE

## 2025-06-26 PROCEDURE — 99214 OFFICE O/P EST MOD 30 MIN: CPT | Performed by: INTERNAL MEDICINE

## 2025-06-26 PROCEDURE — 1159F MED LIST DOCD IN RCRD: CPT | Performed by: INTERNAL MEDICINE

## 2025-06-26 NOTE — PROGRESS NOTES
Cardiology Office Visit      Encounter Date:  06/26/2025    Patient ID:   Nesha Roman is a 74 y.o. female.    Reason For Followup:  Paroxysmal atrial fibrillation  Hypertension  Hyperlipidemia  Coronary artery disease      Brief Clinical History:  Dear Shanell Mckeon APRN    I had the pleasure of seeing Nesha Roman today. As you are well aware, this is a 74 y.o. female past medical history that is significant for history of atrial fibrillation hypertension hyperlipidemia none coronary artery disease here for follow-up    Interval History:  Complaining of episodes of palpitations or dizziness  Complaining of symptoms of presyncope  Complaining of intermittent palpitations  No chest discomfort  Complaining of fatigue  No syncope  Assessment & Plan    Impressions:  Paroxysmal atrial fibrillation/maintaining sinus rhythm  Hypertension/pressures controlled  Hyperlipidemia/controlled on high-dose statins will check lipids  Coronary artery disease/catheterization last year with a moderate obstructive disease involving the LAD with a negative FFR  Normal LV systolic function  Moderate obstructive disease involving LAD    Recommendations:  Continue current medical therapy with aspirin and anticoagulation therapy with Xarelto  Risk benefits and alternatives for long-term oral anticoagulation reviewed and discussed with the patient  Continue current therapy with high-dose statin  Continue current dose of beta-blockers  Check labs including lipid profile  Continue current medical therapy with Xarelto 20 mg p.o. once a day metoprolol 25 mg p.o. twice daily Lipitor 80 mg p.o. once a day aspirin 81 mg p.o. once a day  Labs with primary care physician office  Prior medical records reviewed and discussed with patient  No new cardiac symptoms  Commend extended Holter monitor for 4 weeks to rule out bradycardia versus atrial fibrillation contributing to patient send  Continue current medical therapy  Patient cannot  "afford Xarelto anymore secondary to co-pay issues  Patient is advised to come to the Coumadin clinic and talk to the nurse about considering centering switching from Xarelto to Coumadin in future once she finishes the current dose of Xarelto  Follow-up in office in 6 months        Vitals:  Vitals:    06/26/25 1318   BP: 115/67   BP Location: Left arm   Patient Position: Sitting   Cuff Size: Large Adult   Pulse: 58   SpO2: 96%   Weight: 75.4 kg (166 lb 3.2 oz)   Height: 162.6 cm (64\")       Physical Exam:    General: Alert, cooperative, no distress, appears stated age  Head:  Normocephalic, atraumatic, mucous membranes moist  Eyes:  Conjunctiva/corneas clear, EOM's intact     Neck:  Supple,  no adenopathy;      Lungs: Clear to auscultation bilaterally, no wheezes rhonchi rales are noted  Chest wall: No tenderness  Heart::  Regular rate and rhythm, S1 and S2 normal, no murmur, rub or gallop  Abdomen: Soft, non-tender, nondistended bowel sounds active  Extremities: No cyanosis, clubbing, or edema  Pulses: 2+ and symmetric all extremities  Skin:  No rashes or lesions  Neuro/psych: A&O x3. CN II through XII are grossly intact with appropriate affect              Lab Results   Component Value Date    BUN 12 07/09/2021    CREATININE 0.84 07/09/2021    BCR 14.3 07/09/2021    K 3.6 07/09/2021    CO2 23 07/09/2021    CALCIUM 7.9 (L) 07/09/2021    ALBUMIN 2.9 (L) 07/09/2021    BILITOT 0.2 07/09/2021    AST 25 07/09/2021    ALT 17 07/09/2021        No results found for this or any previous visit.     Lab Results   Component Value Date    CHLPL 134 06/18/2020    TRIG 38 06/18/2020    HDL 68 06/18/2020    LDL 58 06/18/2020                Objective:          Allergies:  No Known Allergies    Medication Review:     Current Outpatient Medications:     aspirin 81 MG EC tablet, ADULT ASPIRIN EC LOW STRENGTH 81 MG ORAL TABLET DELAYED RELEASE, Disp: , Rfl:     atorvastatin (LIPITOR) 80 MG tablet, Take 1 tablet by mouth Daily., Disp: , " Rfl:     azelastine (ASTELIN) 0.1 % nasal spray, USE 1 TO 2 SPRAY(S) IN EACH NOSTRIL TWICE DAILY AS DIRECTED, Disp: , Rfl:     Biotin 31058 MCG tablet, Take  by mouth., Disp: , Rfl:     Cetirizine HCl (ZyrTEC Allergy) 10 MG capsule, Take  by mouth., Disp: , Rfl:     cholecalciferol (VITAMIN D3) 25 MCG (1000 UT) tablet, D3-1000 1000 UNIT TABS, Disp: , Rfl:     docusate sodium (COLACE) 50 MG capsule, Take  by mouth 2 (Two) Times a Day., Disp: , Rfl:     gabapentin (NEURONTIN) 400 MG capsule, Take 1 capsule by mouth 3 (Three) Times a Day., Disp: , Rfl:     metoprolol tartrate (LOPRESSOR) 25 MG tablet, TAKE 1 TABLET BY MOUTH TWICE DAILY, Disp: 180 tablet, Rfl: 2    multivitamin with minerals tablet tablet, HAIR SKIN AND NAILS FORMULA TABS, Disp: , Rfl:     omeprazole (priLOSEC) 40 MG capsule, Take 1 capsule by mouth Daily., Disp: , Rfl:     Polyethylene Glycol 3350 (MIRALAX PO), Take  by mouth., Disp: , Rfl:     rivaroxaban (Xarelto) 20 MG tablet, TAKE 1 TABLET BY MOUTH EVERY DAY, Disp: 90 tablet, Rfl: 2    traMADol (ULTRAM) 50 MG tablet, Take 1 tablet by mouth., Disp: , Rfl:     Family History:  Family History   Problem Relation Age of Onset    Heart disease Father         CHF       Past Medical History:  Past Medical History:   Diagnosis Date    Atrial fibrillation 07/2021    COPD (chronic obstructive pulmonary disease) 052022    Deep vein thrombosis     Hyperlipidemia     Hypertension     Sleep apnea 2021       Past surgical History:  Past Surgical History:   Procedure Laterality Date    CARDIAC CATHETERIZATION      GALLBLADDER SURGERY      HYSTERECTOMY      KNEE ARTHROPLASTY, PARTIAL REPLACEMENT         Social History:  Social History     Socioeconomic History    Marital status:    Tobacco Use    Smoking status: Former     Types: Cigarettes    Smokeless tobacco: Never   Vaping Use    Vaping status: Never Used   Substance and Sexual Activity    Alcohol use: Yes     Alcohol/week: 2.0 standard  drinks of alcohol     Types: 1 Glasses of wine, 1 Cans of beer per week     Comment: Hardly ever drink    Drug use: Never    Sexual activity: Yes     Partners: Male     Birth control/protection: Post-menopausal, Hysterectomy       Review of Systems:  The following systems were reviewed as they relate to the cardiovascular system: Constitutional, Eyes, ENT, Cardiovascular, Respiratory, Gastrointestinal, Integumentary, Neurological, Psychiatric, Hematologic, Endocrine, Musculoskeletal, and Genitourinary. The pertinent cardiovascular findings are reported above with all other cardiovascular points within those systems being negative.    Diagnostic Study Review:     Current Electrocardiogram:    ECG 12 Lead    Date/Time: 6/26/2025 1:55 PM  Performed by: Myra Torres MD    Authorized by: Myra Torres MD  Comparison: compared with previous ECG   Similar to previous ECG  Rhythm: sinus rhythm  Ectopy: unifocal PVCs  Rate: normal  BPM: 58  Conduction: conduction normal  QRS axis: normal  Other findings: non-specific ST-T wave changes    Clinical impression: abnormal EKG                NOTE: The following portions of the patient's history were reviewed and updated this visit as appropriate: allergies, current medications, past family history, past medical history, past social history, past surgical history and problem list.

## 2025-07-15 ENCOUNTER — HOSPITAL ENCOUNTER (OUTPATIENT)
Dept: CARDIOLOGY | Facility: HOSPITAL | Age: 75
Discharge: HOME | End: 2025-07-15
Payer: MEDICARE

## 2025-07-15 DIAGNOSIS — I25.10 CORONARY ARTERY DISEASE INVOLVING NATIVE CORONARY ARTERY OF NATIVE HEART WITHOUT ANGINA PECTORIS: ICD-10-CM

## 2025-07-15 DIAGNOSIS — I48.0 PAROXYSMAL ATRIAL FIBRILLATION: ICD-10-CM

## 2025-07-15 DIAGNOSIS — Z83.3 FAMILY HISTORY OF DIABETES MELLITUS: ICD-10-CM

## 2025-07-15 DIAGNOSIS — I10 PRIMARY HYPERTENSION: ICD-10-CM

## 2025-07-15 DIAGNOSIS — E78.2 MIXED HYPERLIPIDEMIA: ICD-10-CM

## 2025-07-23 RX ORDER — METOPROLOL TARTRATE 25 MG/1
25 TABLET, FILM COATED ORAL 2 TIMES DAILY
Qty: 180 TABLET | Refills: 2 | Status: SHIPPED | OUTPATIENT
Start: 2025-07-23

## 2025-07-25 ENCOUNTER — TELEPHONE (OUTPATIENT)
Dept: CARDIOLOGY | Facility: CLINIC | Age: 75
End: 2025-07-25
Payer: MEDICARE

## 2025-07-25 NOTE — TELEPHONE ENCOUNTER
Per Dr. Chávez, patient can be switched to Eliquis 5 mg b.I.d. after she runs out of Xarelto (another week or so). Patient prefers NOT to change to warfarin but will fill out paper work to obtain Eliquis from the [facility]. Samples of Eliquis left @  to tie her over until [place] shipment comes in. She will pick these up & bring in filled out papers for us to fax to pharmacy with completed written Eliquis Rx. Discussed with patient & she verbalizes understanding of instructions given.

## 2025-07-25 NOTE — TELEPHONE ENCOUNTER
Returned call to patient to let her know the written Rx for Eliquis has been faxed to the [place] pharmacy so all she needs to do is fill out the paperwork & send in with payment information. Pt. Verbalized understanding & will  samples next week for the Eliquis

## 2025-07-25 NOTE — TELEPHONE ENCOUNTER
Caller: Nesha Roman    Relationship to patient: Self    Best call back number: [phone]    Patient is needing: RETURNING A CALL BACK TO PAULO.

## 2025-08-19 LAB
CV ZIO BASELINE AVG BPM: 64
CV ZIO BASELINE BPM HIGH: 111
CV ZIO BASELINE BPM LOW: 46

## 2025-08-20 ENCOUNTER — RESULTS FOLLOW-UP (OUTPATIENT)
Dept: CARDIOLOGY | Facility: CLINIC | Age: 75
End: 2025-08-20
Payer: MEDICARE

## 2025-08-21 ENCOUNTER — APPOINTMENT (OUTPATIENT)
Dept: CARDIOLOGY | Facility: HOSPITAL | Age: 75
End: 2025-08-21
Payer: MEDICARE

## 2025-08-21 ENCOUNTER — APPOINTMENT (OUTPATIENT)
Dept: GENERAL RADIOLOGY | Facility: HOSPITAL | Age: 75
End: 2025-08-21
Payer: MEDICARE

## 2025-08-21 ENCOUNTER — HOSPITAL ENCOUNTER (OUTPATIENT)
Facility: HOSPITAL | Age: 75
Setting detail: OBSERVATION
Discharge: HOME | End: 2025-08-23
Attending: EMERGENCY MEDICINE | Admitting: HOSPITALIST
Payer: MEDICARE

## 2025-08-21 PROBLEM — I48.91 ATRIAL FIBRILLATION WITH RVR: Status: ACTIVE | Noted: 2025-08-21
